# Patient Record
Sex: FEMALE
[De-identification: names, ages, dates, MRNs, and addresses within clinical notes are randomized per-mention and may not be internally consistent; named-entity substitution may affect disease eponyms.]

---

## 2018-12-09 ENCOUNTER — TRANSCRIPTION ENCOUNTER (OUTPATIENT)
Age: 28
End: 2018-12-09

## 2021-08-04 PROBLEM — Z00.00 ENCOUNTER FOR PREVENTIVE HEALTH EXAMINATION: Status: ACTIVE | Noted: 2021-08-04

## 2022-06-27 ENCOUNTER — INPATIENT (INPATIENT)
Facility: HOSPITAL | Age: 32
LOS: 2 days | Discharge: HOME | DRG: 463 | End: 2022-06-30
Attending: INTERNAL MEDICINE | Admitting: STUDENT IN AN ORGANIZED HEALTH CARE EDUCATION/TRAINING PROGRAM
Payer: MEDICAID

## 2022-06-27 VITALS
SYSTOLIC BLOOD PRESSURE: 126 MMHG | WEIGHT: 160.06 LBS | DIASTOLIC BLOOD PRESSURE: 65 MMHG | TEMPERATURE: 99 F | HEART RATE: 67 BPM | HEIGHT: 65 IN | RESPIRATION RATE: 18 BRPM | OXYGEN SATURATION: 100 %

## 2022-06-27 LAB
ALBUMIN SERPL ELPH-MCNC: 4 G/DL — SIGNIFICANT CHANGE UP (ref 3.3–5)
ALP SERPL-CCNC: 68 U/L — SIGNIFICANT CHANGE UP (ref 40–120)
ALT FLD-CCNC: 20 U/L — SIGNIFICANT CHANGE UP (ref 12–78)
ANION GAP SERPL CALC-SCNC: 7 MMOL/L — SIGNIFICANT CHANGE UP (ref 5–17)
APPEARANCE UR: ABNORMAL
AST SERPL-CCNC: 11 U/L — LOW (ref 15–37)
BASOPHILS # BLD AUTO: 0.11 K/UL — SIGNIFICANT CHANGE UP (ref 0–0.2)
BASOPHILS NFR BLD AUTO: 0.6 % — SIGNIFICANT CHANGE UP (ref 0–2)
BILIRUB SERPL-MCNC: 0.2 MG/DL — SIGNIFICANT CHANGE UP (ref 0.2–1.2)
BILIRUB UR-MCNC: NEGATIVE — SIGNIFICANT CHANGE UP
BUN SERPL-MCNC: 13 MG/DL — SIGNIFICANT CHANGE UP (ref 7–23)
CALCIUM SERPL-MCNC: 9.3 MG/DL — SIGNIFICANT CHANGE UP (ref 8.5–10.1)
CHLORIDE SERPL-SCNC: 109 MMOL/L — HIGH (ref 96–108)
CO2 SERPL-SCNC: 24 MMOL/L — SIGNIFICANT CHANGE UP (ref 22–31)
COLOR SPEC: ABNORMAL
CREAT SERPL-MCNC: 0.82 MG/DL — SIGNIFICANT CHANGE UP (ref 0.5–1.3)
DIFF PNL FLD: ABNORMAL
EGFR: 99 ML/MIN/1.73M2 — SIGNIFICANT CHANGE UP
EOSINOPHIL # BLD AUTO: 0.24 K/UL — SIGNIFICANT CHANGE UP (ref 0–0.5)
EOSINOPHIL NFR BLD AUTO: 1.3 % — SIGNIFICANT CHANGE UP (ref 0–6)
GLUCOSE SERPL-MCNC: 106 MG/DL — HIGH (ref 70–99)
GLUCOSE UR QL: NEGATIVE — SIGNIFICANT CHANGE UP
HCG SERPL-ACNC: <1 MIU/ML — SIGNIFICANT CHANGE UP
HCT VFR BLD CALC: 40.6 % — SIGNIFICANT CHANGE UP (ref 34.5–45)
HGB BLD-MCNC: 13.1 G/DL — SIGNIFICANT CHANGE UP (ref 11.5–15.5)
IMM GRANULOCYTES NFR BLD AUTO: 0.4 % — SIGNIFICANT CHANGE UP (ref 0–1.5)
KETONES UR-MCNC: ABNORMAL
LEUKOCYTE ESTERASE UR-ACNC: ABNORMAL
LYMPHOCYTES # BLD AUTO: 16.4 % — SIGNIFICANT CHANGE UP (ref 13–44)
LYMPHOCYTES # BLD AUTO: 2.91 K/UL — SIGNIFICANT CHANGE UP (ref 1–3.3)
MCHC RBC-ENTMCNC: 26.4 PG — LOW (ref 27–34)
MCHC RBC-ENTMCNC: 32.3 GM/DL — SIGNIFICANT CHANGE UP (ref 32–36)
MCV RBC AUTO: 81.7 FL — SIGNIFICANT CHANGE UP (ref 80–100)
MONOCYTES # BLD AUTO: 0.85 K/UL — SIGNIFICANT CHANGE UP (ref 0–0.9)
MONOCYTES NFR BLD AUTO: 4.8 % — SIGNIFICANT CHANGE UP (ref 2–14)
NEUTROPHILS # BLD AUTO: 13.59 K/UL — HIGH (ref 1.8–7.4)
NEUTROPHILS NFR BLD AUTO: 76.5 % — SIGNIFICANT CHANGE UP (ref 43–77)
NITRITE UR-MCNC: POSITIVE
PH UR: 6.5 — SIGNIFICANT CHANGE UP (ref 5–8)
PLATELET # BLD AUTO: 447 K/UL — HIGH (ref 150–400)
POTASSIUM SERPL-MCNC: 4 MMOL/L — SIGNIFICANT CHANGE UP (ref 3.5–5.3)
POTASSIUM SERPL-SCNC: 4 MMOL/L — SIGNIFICANT CHANGE UP (ref 3.5–5.3)
PROT SERPL-MCNC: 8.1 GM/DL — SIGNIFICANT CHANGE UP (ref 6–8.3)
PROT UR-MCNC: 100
RBC # BLD: 4.97 M/UL — SIGNIFICANT CHANGE UP (ref 3.8–5.2)
RBC # FLD: 14 % — SIGNIFICANT CHANGE UP (ref 10.3–14.5)
SODIUM SERPL-SCNC: 140 MMOL/L — SIGNIFICANT CHANGE UP (ref 135–145)
SP GR SPEC: 1.01 — SIGNIFICANT CHANGE UP (ref 1.01–1.02)
UROBILINOGEN FLD QL: 1
WBC # BLD: 17.78 K/UL — HIGH (ref 3.8–10.5)
WBC # FLD AUTO: 17.78 K/UL — HIGH (ref 3.8–10.5)

## 2022-06-27 PROCEDURE — 99285 EMERGENCY DEPT VISIT HI MDM: CPT

## 2022-06-27 PROCEDURE — 74176 CT ABD & PELVIS W/O CONTRAST: CPT | Mod: 26,MA

## 2022-06-27 RX ORDER — MORPHINE SULFATE 50 MG/1
4 CAPSULE, EXTENDED RELEASE ORAL ONCE
Refills: 0 | Status: DISCONTINUED | OUTPATIENT
Start: 2022-06-27 | End: 2022-06-27

## 2022-06-27 RX ORDER — ONDANSETRON 8 MG/1
4 TABLET, FILM COATED ORAL ONCE
Refills: 0 | Status: COMPLETED | OUTPATIENT
Start: 2022-06-27 | End: 2022-06-27

## 2022-06-27 RX ORDER — CEFTRIAXONE 500 MG/1
1000 INJECTION, POWDER, FOR SOLUTION INTRAMUSCULAR; INTRAVENOUS ONCE
Refills: 0 | Status: COMPLETED | OUTPATIENT
Start: 2022-06-27 | End: 2022-06-27

## 2022-06-27 RX ORDER — KETOROLAC TROMETHAMINE 30 MG/ML
30 SYRINGE (ML) INJECTION ONCE
Refills: 0 | Status: DISCONTINUED | OUTPATIENT
Start: 2022-06-27 | End: 2022-06-27

## 2022-06-27 RX ORDER — SODIUM CHLORIDE 9 MG/ML
1000 INJECTION INTRAMUSCULAR; INTRAVENOUS; SUBCUTANEOUS ONCE
Refills: 0 | Status: COMPLETED | OUTPATIENT
Start: 2022-06-27 | End: 2022-06-27

## 2022-06-27 RX ADMIN — MORPHINE SULFATE 4 MILLIGRAM(S): 50 CAPSULE, EXTENDED RELEASE ORAL at 23:53

## 2022-06-27 RX ADMIN — ONDANSETRON 4 MILLIGRAM(S): 8 TABLET, FILM COATED ORAL at 22:04

## 2022-06-27 RX ADMIN — MORPHINE SULFATE 4 MILLIGRAM(S): 50 CAPSULE, EXTENDED RELEASE ORAL at 22:18

## 2022-06-27 RX ADMIN — SODIUM CHLORIDE 1000 MILLILITER(S): 9 INJECTION INTRAMUSCULAR; INTRAVENOUS; SUBCUTANEOUS at 22:03

## 2022-06-27 RX ADMIN — CEFTRIAXONE 100 MILLIGRAM(S): 500 INJECTION, POWDER, FOR SOLUTION INTRAMUSCULAR; INTRAVENOUS at 23:23

## 2022-06-27 RX ADMIN — Medication 30 MILLIGRAM(S): at 22:18

## 2022-06-27 NOTE — ED STATDOCS - PROGRESS NOTE DETAILS
Patient admitted to medicine, urology consulted, pending call back. William Montiel D.O. Discussed with urology on call, Dr. Cisneros, consult in AM. William Montiel D.O.

## 2022-06-27 NOTE — ED STATDOCS - GASTROINTESTINAL, MLM
abdomen soft, mild LLQ tenderness, and non-distended. Bowel sounds present. No rebound or guarding. No CVA tenderness.

## 2022-06-27 NOTE — ED STATDOCS - CLINICAL SUMMARY MEDICAL DECISION MAKING FREE TEXT BOX
Patient afebrile, normal BP and HR.  UTI on UA, with 4 mm kidney stone distal left ureter with mild hydro.  Only one SIRS criteria for WBC elevation.  Given IVF, pain meds, zofran.  Still having pain.  Admit to medicine service.  Consulting urology.

## 2022-06-27 NOTE — ED ADULT NURSE NOTE - NSIMPLEMENTINTERV_GEN_ALL_ED
Implemented All Universal Safety Interventions:  [place] to call system. Call bell, personal items and telephone within reach. Instruct patient to call for assistance. Room bathroom lighting operational. Non-slip footwear when patient is off stretcher. Physically safe environment: no spills, clutter or unnecessary equipment. Stretcher in lowest position, wheels locked, appropriate side rails in place.

## 2022-06-27 NOTE — ED STATDOCS - OBJECTIVE STATEMENT
32 y/o female with no significant PMHx presents to the ED c/o left flank pain, nausea, and difficulty urinating since this morning. Pt reports she urinated once earlier today and it was dark. Pt also had 1 episode of vomiting. Denies diarrhea and any other symptoms. Denies history of kidney stones.

## 2022-06-27 NOTE — ED ADULT NURSE NOTE - OBJECTIVE STATEMENT
Patient presents to the emergency room with complaints of lower back pain radiating to abdomen. Patient alert, oriented, tearful, and guarding abdomen. Patient unable to void at this time for urine sample but complained of hematuria at home. Patient denies vomiting, diarrhea, fever, chills.

## 2022-06-27 NOTE — ED STATDOCS - ADMIT DISPOSITION PRESENT ON ADMISSION SEPSIS
Tazorac Counseling:  Patient advised that medication is irritating and drying.  Patient may need to apply sparingly and wash off after an hour before eventually leaving it on overnight.  The patient verbalized understanding of the proper use and possible adverse effects of tazorac.  All of the patient's questions and concerns were addressed.
Isotretinoin Counseling: Patient should get monthly blood tests, not donate blood, not drive at night if vision affected, not share medication, and not undergo elective surgery for 6 months after tx completed. Side effects reviewed, pt to contact office should one occur.
Minocycline Pregnancy And Lactation Text: This medication is Pregnancy Category D and not consider safe during pregnancy. It is also excreted in breast milk.
Topical Sulfur Applications Pregnancy And Lactation Text: This medication is Pregnancy Category C and has an unknown safety profile during pregnancy. It is unknown if this topical medication is excreted in breast milk.
Bactrim Counseling:  I discussed with the patient the risks of sulfa antibiotics including but not limited to GI upset, allergic reaction, drug rash, diarrhea, dizziness, photosensitivity, and yeast infections.  Rarely, more serious reactions can occur including but not limited to aplastic anemia, agranulocytosis, methemoglobinemia, blood dyscrasias, liver or kidney failure, lung infiltrates or desquamative/blistering drug rashes.
Dapsone Pregnancy And Lactation Text: This medication is Pregnancy Category C and is not considered safe during pregnancy or breast feeding.
Topical Retinoid Pregnancy And Lactation Text: This medication is Pregnancy Category C. It is unknown if this medication is excreted in breast milk.
Erythromycin Pregnancy And Lactation Text: This medication is Pregnancy Category B and is considered safe during pregnancy. It is also excreted in breast milk.
Topical Sulfur Applications Counseling: Topical Sulfur Counseling: Patient counseled that this medication may cause skin irritation or allergic reactions.  In the event of skin irritation, the patient was advised to reduce the amount of the drug applied or use it less frequently.   The patient verbalized understanding of the proper use and possible adverse effects of topical sulfur application.  All of the patient's questions and concerns were addressed.
Minocycline Counseling: Patient advised regarding possible photosensitivity and discoloration of the teeth, skin, lips, tongue and gums.  Patient instructed to avoid sunlight, if possible.  When exposed to sunlight, patients should wear protective clothing, sunglasses, and sunscreen.  The patient was instructed to call the office immediately if the following severe adverse effects occur:  hearing changes, easy bruising/bleeding, severe headache, or vision changes.  The patient verbalized understanding of the proper use and possible adverse effects of minocycline.  All of the patient's questions and concerns were addressed.
Dapsone Counseling: I discussed with the patient the risks of dapsone including but not limited to hemolytic anemia, agranulocytosis, rashes, methemoglobinemia, kidney failure, peripheral neuropathy, headaches, GI upset, and liver toxicity.  Patients who start dapsone require monitoring including baseline LFTs and weekly CBCs for the first month, then every month thereafter.  The patient verbalized understanding of the proper use and possible adverse effects of dapsone.  All of the patient's questions and concerns were addressed.
Azithromycin Pregnancy And Lactation Text: This medication is considered safe during pregnancy and is also secreted in breast milk.
High Dose Vitamin A Pregnancy And Lactation Text: High dose vitamin A therapy is contraindicated during pregnancy and breast feeding.
Topical Clindamycin Pregnancy And Lactation Text: This medication is Pregnancy Category B and is considered safe during pregnancy. It is unknown if it is excreted in breast milk.
Tetracycline Counseling: Patient counseled regarding possible photosensitivity and increased risk for sunburn.  Patient instructed to avoid sunlight, if possible.  When exposed to sunlight, patients should wear protective clothing, sunglasses, and sunscreen.  The patient was instructed to call the office immediately if the following severe adverse effects occur:  hearing changes, easy bruising/bleeding, severe headache, or vision changes.  The patient verbalized understanding of the proper use and possible adverse effects of tetracycline.  All of the patient's questions and concerns were addressed. Patient understands to avoid pregnancy while on therapy due to potential birth defects.
Birth Control Pills Pregnancy And Lactation Text: This medication should be avoided if pregnant and for the first 30 days post-partum.
Erythromycin Counseling:  I discussed with the patient the risks of erythromycin including but not limited to GI upset, allergic reaction, drug rash, diarrhea, increase in liver enzymes, and yeast infections.
Topical Retinoid counseling:  Patient advised to apply a pea-sized amount only at bedtime and wait 30 minutes after washing their face before applying.  If too drying, patient may add a non-comedogenic moisturizer. The patient verbalized understanding of the proper use and possible adverse effects of retinoids.  All of the patient's questions and concerns were addressed.
Azithromycin Counseling:  I discussed with the patient the risks of azithromycin including but not limited to GI upset, allergic reaction, drug rash, diarrhea, and yeast infections.
High Dose Vitamin A Counseling: Side effects reviewed, pt to contact office should one occur.
Spironolactone Pregnancy And Lactation Text: This medication can cause feminization of the male fetus and should be avoided during pregnancy. The active metabolite is also found in breast milk.
Use Enhanced Medication Counseling?: No
Benzoyl Peroxide Pregnancy And Lactation Text: This medication is Pregnancy Category C. It is unknown if benzoyl peroxide is excreted in breast milk.
Doxycycline Pregnancy And Lactation Text: This medication is Pregnancy Category D and not consider safe during pregnancy. It is also excreted in breast milk but is considered safe for shorter treatment courses.
Topical Clindamycin Counseling: Patient counseled that this medication may cause skin irritation or allergic reactions.  In the event of skin irritation, the patient was advised to reduce the amount of the drug applied or use it less frequently.   The patient verbalized understanding of the proper use and possible adverse effects of clindamycin.  All of the patient's questions and concerns were addressed.
Detail Level: Zone
Birth Control Pills Counseling: Birth Control Pill Counseling: I discussed with the patient the potential side effects of OCPs including but not limited to increased risk of stroke, heart attack, thrombophlebitis, deep venous thrombosis, hepatic adenomas, breast changes, GI upset, headaches, and depression.  The patient verbalized understanding of the proper use and possible adverse effects of OCPs. All of the patient's questions and concerns were addressed.
Spironolactone Counseling: Patient advised regarding risks of diarrhea, abdominal pain, hyperkalemia, birth defects (for female patients), liver toxicity and renal toxicity. The patient may need blood work to monitor liver and kidney function and potassium levels while on therapy. The patient verbalized understanding of the proper use and possible adverse effects of spironolactone.  All of the patient's questions and concerns were addressed.
Doxycycline Counseling:  Patient counseled regarding possible photosensitivity and increased risk for sunburn.  Patient instructed to avoid sunlight, if possible.  When exposed to sunlight, patients should wear protective clothing, sunglasses, and sunscreen.  The patient was instructed to call the office immediately if the following severe adverse effects occur:  hearing changes, easy bruising/bleeding, severe headache, or vision changes.  The patient verbalized understanding of the proper use and possible adverse effects of doxycycline.  All of the patient's questions and concerns were addressed.
Isotretinoin Pregnancy And Lactation Text: This medication is Pregnancy Category X and is considered extremely dangerous during pregnancy. It is unknown if it is excreted in breast milk.
Tazorac Pregnancy And Lactation Text: This medication is not safe during pregnancy. It is unknown if this medication is excreted in breast milk.
Bactrim Pregnancy And Lactation Text: This medication is Pregnancy Category D and is known to cause fetal risk.  It is also excreted in breast milk.
Benzoyl Peroxide Counseling: Patient counseled that medicine may cause skin irritation and bleach clothing.  In the event of skin irritation, the patient was advised to reduce the amount of the drug applied or use it less frequently.   The patient verbalized understanding of the proper use and possible adverse effects of benzoyl peroxide.  All of the patient's questions and concerns were addressed.
No

## 2022-06-28 DIAGNOSIS — N20.0 CALCULUS OF KIDNEY: ICD-10-CM

## 2022-06-28 DIAGNOSIS — N20.1 CALCULUS OF URETER: ICD-10-CM

## 2022-06-28 LAB
ANION GAP SERPL CALC-SCNC: 5 MMOL/L — SIGNIFICANT CHANGE UP (ref 5–17)
BUN SERPL-MCNC: 10 MG/DL — SIGNIFICANT CHANGE UP (ref 7–23)
CALCIUM SERPL-MCNC: 8.3 MG/DL — LOW (ref 8.5–10.1)
CHLORIDE SERPL-SCNC: 114 MMOL/L — HIGH (ref 96–108)
CO2 SERPL-SCNC: 23 MMOL/L — SIGNIFICANT CHANGE UP (ref 22–31)
CREAT SERPL-MCNC: 0.64 MG/DL — SIGNIFICANT CHANGE UP (ref 0.5–1.3)
EGFR: 122 ML/MIN/1.73M2 — SIGNIFICANT CHANGE UP
GLUCOSE SERPL-MCNC: 84 MG/DL — SIGNIFICANT CHANGE UP (ref 70–99)
HCT VFR BLD CALC: 37.7 % — SIGNIFICANT CHANGE UP (ref 34.5–45)
HGB BLD-MCNC: 11.6 G/DL — SIGNIFICANT CHANGE UP (ref 11.5–15.5)
MCHC RBC-ENTMCNC: 25.8 PG — LOW (ref 27–34)
MCHC RBC-ENTMCNC: 30.8 GM/DL — LOW (ref 32–36)
MCV RBC AUTO: 84 FL — SIGNIFICANT CHANGE UP (ref 80–100)
PLATELET # BLD AUTO: 354 K/UL — SIGNIFICANT CHANGE UP (ref 150–400)
POTASSIUM SERPL-MCNC: 3.7 MMOL/L — SIGNIFICANT CHANGE UP (ref 3.5–5.3)
POTASSIUM SERPL-SCNC: 3.7 MMOL/L — SIGNIFICANT CHANGE UP (ref 3.5–5.3)
RBC # BLD: 4.49 M/UL — SIGNIFICANT CHANGE UP (ref 3.8–5.2)
RBC # FLD: 14.2 % — SIGNIFICANT CHANGE UP (ref 10.3–14.5)
SARS-COV-2 RNA SPEC QL NAA+PROBE: SIGNIFICANT CHANGE UP
SODIUM SERPL-SCNC: 142 MMOL/L — SIGNIFICANT CHANGE UP (ref 135–145)
WBC # BLD: 12.74 K/UL — HIGH (ref 3.8–10.5)
WBC # FLD AUTO: 12.74 K/UL — HIGH (ref 3.8–10.5)

## 2022-06-28 PROCEDURE — 85027 COMPLETE CBC AUTOMATED: CPT

## 2022-06-28 PROCEDURE — 80048 BASIC METABOLIC PNL TOTAL CA: CPT

## 2022-06-28 PROCEDURE — C2625: CPT

## 2022-06-28 PROCEDURE — 76000 FLUOROSCOPY <1 HR PHYS/QHP: CPT

## 2022-06-28 PROCEDURE — 52332 CYSTOSCOPY AND TREATMENT: CPT | Mod: LT

## 2022-06-28 PROCEDURE — 36415 COLL VENOUS BLD VENIPUNCTURE: CPT

## 2022-06-28 PROCEDURE — 99222 1ST HOSP IP/OBS MODERATE 55: CPT

## 2022-06-28 PROCEDURE — C1769: CPT

## 2022-06-28 PROCEDURE — U0003: CPT

## 2022-06-28 PROCEDURE — U0005: CPT

## 2022-06-28 RX ORDER — MORPHINE SULFATE 50 MG/1
2 CAPSULE, EXTENDED RELEASE ORAL EVERY 4 HOURS
Refills: 0 | Status: DISCONTINUED | OUTPATIENT
Start: 2022-06-28 | End: 2022-06-30

## 2022-06-28 RX ORDER — LANOLIN ALCOHOL/MO/W.PET/CERES
3 CREAM (GRAM) TOPICAL AT BEDTIME
Refills: 0 | Status: DISCONTINUED | OUTPATIENT
Start: 2022-06-28 | End: 2022-06-30

## 2022-06-28 RX ORDER — SODIUM CHLORIDE 9 MG/ML
1000 INJECTION INTRAMUSCULAR; INTRAVENOUS; SUBCUTANEOUS
Refills: 0 | Status: DISCONTINUED | OUTPATIENT
Start: 2022-06-28 | End: 2022-06-28

## 2022-06-28 RX ORDER — OXYCODONE HYDROCHLORIDE 5 MG/1
5 TABLET ORAL EVERY 4 HOURS
Refills: 0 | Status: DISCONTINUED | OUTPATIENT
Start: 2022-06-28 | End: 2022-06-30

## 2022-06-28 RX ORDER — ACETAMINOPHEN 500 MG
650 TABLET ORAL EVERY 6 HOURS
Refills: 0 | Status: DISCONTINUED | OUTPATIENT
Start: 2022-06-28 | End: 2022-06-30

## 2022-06-28 RX ORDER — OXYCODONE HYDROCHLORIDE 5 MG/1
5 TABLET ORAL ONCE
Refills: 0 | Status: DISCONTINUED | OUTPATIENT
Start: 2022-06-28 | End: 2022-06-28

## 2022-06-28 RX ORDER — ONDANSETRON 8 MG/1
4 TABLET, FILM COATED ORAL ONCE
Refills: 0 | Status: DISCONTINUED | OUTPATIENT
Start: 2022-06-28 | End: 2022-06-28

## 2022-06-28 RX ORDER — TAMSULOSIN HYDROCHLORIDE 0.4 MG/1
0.4 CAPSULE ORAL AT BEDTIME
Refills: 0 | Status: DISCONTINUED | OUTPATIENT
Start: 2022-06-28 | End: 2022-06-30

## 2022-06-28 RX ORDER — ALBUTEROL 90 UG/1
2 AEROSOL, METERED ORAL EVERY 6 HOURS
Refills: 0 | Status: DISCONTINUED | OUTPATIENT
Start: 2022-06-28 | End: 2022-06-30

## 2022-06-28 RX ORDER — IBUPROFEN 200 MG
400 TABLET ORAL EVERY 6 HOURS
Refills: 0 | Status: DISCONTINUED | OUTPATIENT
Start: 2022-06-28 | End: 2022-06-30

## 2022-06-28 RX ORDER — FENTANYL CITRATE 50 UG/ML
25 INJECTION INTRAVENOUS
Refills: 0 | Status: DISCONTINUED | OUTPATIENT
Start: 2022-06-28 | End: 2022-06-28

## 2022-06-28 RX ORDER — CEFTRIAXONE 500 MG/1
1000 INJECTION, POWDER, FOR SOLUTION INTRAMUSCULAR; INTRAVENOUS EVERY 24 HOURS
Refills: 0 | Status: DISCONTINUED | OUTPATIENT
Start: 2022-06-28 | End: 2022-06-28

## 2022-06-28 RX ORDER — CEFTRIAXONE 500 MG/1
1000 INJECTION, POWDER, FOR SOLUTION INTRAMUSCULAR; INTRAVENOUS EVERY 24 HOURS
Refills: 0 | Status: DISCONTINUED | OUTPATIENT
Start: 2022-06-28 | End: 2022-06-30

## 2022-06-28 RX ORDER — SODIUM CHLORIDE 9 MG/ML
1000 INJECTION, SOLUTION INTRAVENOUS
Refills: 0 | Status: DISCONTINUED | OUTPATIENT
Start: 2022-06-28 | End: 2022-06-28

## 2022-06-28 RX ORDER — ONDANSETRON 8 MG/1
4 TABLET, FILM COATED ORAL EVERY 6 HOURS
Refills: 0 | Status: DISCONTINUED | OUTPATIENT
Start: 2022-06-28 | End: 2022-06-30

## 2022-06-28 RX ORDER — SODIUM CHLORIDE 9 MG/ML
1000 INJECTION, SOLUTION INTRAVENOUS
Refills: 0 | Status: DISCONTINUED | OUTPATIENT
Start: 2022-06-28 | End: 2022-06-29

## 2022-06-28 RX ORDER — ACETAMINOPHEN 500 MG
1000 TABLET ORAL ONCE
Refills: 0 | Status: DISCONTINUED | OUTPATIENT
Start: 2022-06-28 | End: 2022-06-28

## 2022-06-28 RX ADMIN — SODIUM CHLORIDE 100 MILLILITER(S): 9 INJECTION, SOLUTION INTRAVENOUS at 11:24

## 2022-06-28 RX ADMIN — Medication 650 MILLIGRAM(S): at 20:15

## 2022-06-28 RX ADMIN — MORPHINE SULFATE 2 MILLIGRAM(S): 50 CAPSULE, EXTENDED RELEASE ORAL at 13:49

## 2022-06-28 RX ADMIN — SODIUM CHLORIDE 100 MILLILITER(S): 9 INJECTION, SOLUTION INTRAVENOUS at 13:50

## 2022-06-28 RX ADMIN — OXYCODONE HYDROCHLORIDE 5 MILLIGRAM(S): 5 TABLET ORAL at 23:58

## 2022-06-28 RX ADMIN — MORPHINE SULFATE 2 MILLIGRAM(S): 50 CAPSULE, EXTENDED RELEASE ORAL at 07:35

## 2022-06-28 RX ADMIN — MORPHINE SULFATE 2 MILLIGRAM(S): 50 CAPSULE, EXTENDED RELEASE ORAL at 03:12

## 2022-06-28 RX ADMIN — Medication 400 MILLIGRAM(S): at 22:57

## 2022-06-28 RX ADMIN — TAMSULOSIN HYDROCHLORIDE 0.4 MILLIGRAM(S): 0.4 CAPSULE ORAL at 22:47

## 2022-06-28 RX ADMIN — SODIUM CHLORIDE 100 MILLILITER(S): 9 INJECTION INTRAMUSCULAR; INTRAVENOUS; SUBCUTANEOUS at 07:35

## 2022-06-28 RX ADMIN — Medication 650 MILLIGRAM(S): at 19:41

## 2022-06-28 RX ADMIN — Medication 400 MILLIGRAM(S): at 23:45

## 2022-06-28 RX ADMIN — MORPHINE SULFATE 2 MILLIGRAM(S): 50 CAPSULE, EXTENDED RELEASE ORAL at 02:57

## 2022-06-28 NOTE — ED ADULT NURSE REASSESSMENT NOTE - NS ED NURSE REASSESS COMMENT FT1
HOC from previous RN Shameka. Fluid infusing, appreciates no c/o pain at this time. Will continue to monitor and maintain safety in ED. Patient admitted, pending COVID result and bed placement.

## 2022-06-28 NOTE — ED ADULT NURSE REASSESSMENT NOTE - NS ED NURSE REASSESS COMMENT FT1
Pt received from ED nurse. Pt stable. VS WNL. Pt c/o pain. Pt given PRN Morphine 2mg IVP. Pt able to ambulate self to restroom. Pt ready to move to unit. Safety and comfort measures applied. Will con't to monitor.

## 2022-06-28 NOTE — H&P ADULT - HISTORY OF PRESENT ILLNESS
31/F with no significant PMHx presents to the ED c/o left flank pain, nausea, and difficulty urinating x 1 day.  Pt reports her urine was dark in color. Pt also had 1 episode of vomiting. Denies diarrhea and any other symptoms. Denies history of kidney stones.    CT abdo showed 4  mm distal left ureteral stone

## 2022-06-28 NOTE — H&P ADULT - NSHPLABSRESULTS_GEN_ALL_CORE
All Labs/EKG/Radiology/Meds reviewed by me.     Lab Results:  CBC  CBC Full  -  ( 2022 21:28 )  WBC Count : 17.78 K/uL  RBC Count : 4.97 M/uL  Hemoglobin : 13.1 g/dL  Hematocrit : 40.6 %  Platelet Count - Automated : 447 K/uL  Mean Cell Volume : 81.7 fl  Mean Cell Hemoglobin : 26.4 pg  Mean Cell Hemoglobin Concentration : 32.3 gm/dL  Auto Neutrophil # : 13.59 K/uL  Auto Lymphocyte # : 2.91 K/uL  Auto Monocyte # : 0.85 K/uL  Auto Eosinophil # : 0.24 K/uL  Auto Basophil # : 0.11 K/uL  Auto Neutrophil % : 76.5 %  Auto Lymphocyte % : 16.4 %  Auto Monocyte % : 4.8 %  Auto Eosinophil % : 1.3 %  Auto Basophil % : 0.6 %    .		Differential:	[] Automated		[] Manual  Chemistry                        13.1   17.78 )-----------( 447      ( 2022 21:28 )             40.6     27    140  |  109<H>  |  13  ----------------------------<  106<H>  4.0   |  24  |  0.82    Ca    9.3      2022 21:28    TPro  8.1  /  Alb  4.0  /  TBili  0.2  /  DBili  x   /  AST  11<L>  /  ALT  20  /  AlkPhos  68      LIVER FUNCTIONS - ( 2022 21:28 )  Alb: 4.0 g/dL / Pro: 8.1 gm/dL / ALK PHOS: 68 U/L / ALT: 20 U/L / AST: 11 U/L / GGT: x             Urinalysis Basic - ( 2022 22:04 )    Color: Red / Appearance: bloody / S.015 / pH: x  Gluc: x / Ketone: Trace  / Bili: Negative / Urobili: 1   Blood: x / Protein: 100 / Nitrite: Positive   Leuk Esterase: Trace / RBC: >50 /HPF / WBC 3-5   Sq Epi: x / Non Sq Epi: Moderate / Bacteria: Moderate    RADIOLOGY RESULTS:  < from: CT Abdomen and Pelvis No Cont (22 @ 22:54) >    4 mm stone in distal left ureter with associated mild left hydronephrosis.    < end of copied text >        MEDICATIONS  (STANDING):  cefTRIAXone   IVPB 1000 milliGRAM(s) IV Intermittent every 24 hours  dextrose 5% + sodium chloride 0.9%. 1000 milliLiter(s) (100 mL/Hr) IV Continuous <Continuous>  tamsulosin 0.4 milliGRAM(s) Oral at bedtime    MEDICATIONS  (PRN):  acetaminophen     Tablet .. 650 milliGRAM(s) Oral every 6 hours PRN Temp greater or equal to 38C (100.4F)  ALBUTerol    90 MICROgram(s) HFA Inhaler 2 Puff(s) Inhalation every 6 hours PRN Shortness of Breath and/or Wheezing  aluminum hydroxide/magnesium hydroxide/simethicone Suspension 30 milliLiter(s) Oral every 4 hours PRN Dyspepsia  melatonin 3 milliGRAM(s) Oral at bedtime PRN Insomnia  morphine  - Injectable 2 milliGRAM(s) IV Push every 4 hours PRN Severe Pain (7 - 10)  ondansetron Injectable 4 milliGRAM(s) IV Push every 6 hours PRN Nausea and/or Vomiting

## 2022-06-28 NOTE — H&P ADULT - NSHPPHYSICALEXAM_GEN_ALL_CORE
PHYSICAL EXAM:    Daily Height in cm: 165.1 (27 Jun 2022 20:18)    Daily     Vital Signs Last 24 Hrs  T(C): 36.5 (28 Jun 2022 08:29), Max: 37.1 (27 Jun 2022 20:18)  T(F): 97.7 (28 Jun 2022 08:29), Max: 98.8 (27 Jun 2022 20:18)  HR: 56 (28 Jun 2022 08:29) (56 - 72)  BP: 102/78 (28 Jun 2022 08:29) (102/78 - 126/65)  BP(mean): 87 (28 Jun 2022 08:29) (84 - 92)  RR: 17 (28 Jun 2022 08:29) (17 - 18)  SpO2: 100% (28 Jun 2022 08:29) (100% - 100%)    Constitutional: Weak  appearing  HEENT: Atraumatic, LATANYA, Normal, No congestion  Respiratory: Breath Sounds normal, no rhonchi/wheeze  Cardiovascular: N S1S2;   Gastrointestinal: Abdomen soft, non tender, Bowel Sounds present  Extremities: No edema, peripheral pulses present  Neurological: AAO x 3, no gross focal motor deficits  Skin: Non cellulitic, no rash, ulcers  Lymph Nodes: No lymphadenopathy noted  Back: No CVA tenderness   Musculoskeletal: non tender  Breasts: Deferred  Genitourinary: deferred  Rectal: Deferred

## 2022-06-28 NOTE — CONSULT NOTE ADULT - SUBJECTIVE AND OBJECTIVE BOX
HPI:  31/F with no significant PMHx presents to the ED c/o left flank pain, nausea, and difficulty urinating x 1 day.  Pt reports her urine was dark in color. Pt also had 1 episode of vomiting. Denies diarrhea and any other symptoms. Denies history of kidney stones.    CT abdo showed 4  mm distal left ureteral stone (2022 11:18)    32 yo female with no PMH admitted for intractable left flank pain found to have a 4 mm left distal ureteral stone and concern for UTI. Patient seen at bedside, reports she developed intractable left flank pain and difficulty voiding since yesterday also had a NBNB vomiting episode. Reports chills but denies any fevers, dysuria, frequency or urgency.     PAST MEDICAL & SURGICAL HISTORY:  No pertinent past medical history      No significant past surgical history          REVIEW OF SYSTEMS    All other review of systems neg, except as noted in HPI    MEDICATIONS  (STANDING):  cefTRIAXone   IVPB 1000 milliGRAM(s) IV Intermittent every 24 hours  dextrose 5% + sodium chloride 0.9%. 1000 milliLiter(s) (100 mL/Hr) IV Continuous <Continuous>  tamsulosin 0.4 milliGRAM(s) Oral at bedtime    MEDICATIONS  (PRN):  acetaminophen     Tablet .. 650 milliGRAM(s) Oral every 6 hours PRN Temp greater or equal to 38C (100.4F)  ALBUTerol    90 MICROgram(s) HFA Inhaler 2 Puff(s) Inhalation every 6 hours PRN Shortness of Breath and/or Wheezing  aluminum hydroxide/magnesium hydroxide/simethicone Suspension 30 milliLiter(s) Oral every 4 hours PRN Dyspepsia  melatonin 3 milliGRAM(s) Oral at bedtime PRN Insomnia  morphine  - Injectable 2 milliGRAM(s) IV Push every 4 hours PRN Severe Pain (7 - 10)  ondansetron Injectable 4 milliGRAM(s) IV Push every 6 hours PRN Nausea and/or Vomiting      Allergies    No Known Allergies    Intolerances        SOCIAL HISTORY:    FAMILY HISTORY:  Family history of diabetes mellitus in mother        Vital Signs Last 24 Hrs  T(C): 36.5 (2022 08:29), Max: 37.1 (2022 20:18)  T(F): 97.7 (2022 08:29), Max: 98.8 (2022 20:18)  HR: 56 (2022 08:29) (56 - 72)  BP: 102/78 (2022 08:29) (102/78 - 126/65)  BP(mean): 87 (2022 08:29) (84 - 92)  RR: 17 (2022 08:29) (17 - 18)  SpO2: 100% (2022 08:29) (100% - 100%)    PHYSICAL EXAM:    General: No distress, No anxiety  VITALS  T(C): 36.5 (22 @ 08:29), Max: 37.1 (22 @ 20:18)  HR: 56 (22 @ 08:29) (56 - 72)  BP: 102/78 (22 @ 08:29) (102/78 - 126/65)  RR: 17 (22 @ 08:29) (17 - 18)  SpO2: 100% (22 @ 08:29) (100% - 100%)            Skin     : No jaundice, No lesions, No swelling  HEENT: Normocephalic, no icterus , EOM full , No epistaxis  Lung    : No resp distress  Abdo:   : Soft, Non tender, No guarding, No distension   Back    : No CVAT b/l    Genitalia Female: No Bess  Neuro   : A&Ox3        LABS:                        11.6   12.74 )-----------( 354      ( 2022 11:26 )             37.7     06-28    142  |  114<H>  |  10  ----------------------------<  84  3.7   |  23  |  0.64    Ca    8.3<L>      2022 11:26    TPro  8.1  /  Alb  4.0  /  TBili  0.2  /  DBili  x   /  AST  11<L>  /  ALT  20  /  AlkPhos  68  06-27      Urinalysis Basic - ( 2022 22:04 )    Color: Red / Appearance: bloody / S.015 / pH: x  Gluc: x / Ketone: Trace  / Bili: Negative / Urobili: 1   Blood: x / Protein: 100 / Nitrite: Positive   Leuk Esterase: Trace / RBC: >50 /HPF / WBC 3-5   Sq Epi: x / Non Sq Epi: Moderate / Bacteria: Moderate        RADIOLOGY & ADDITIONAL STUDIES:  < from: CT Abdomen and Pelvis No Cont (22 @ 22:54) >  ACC: [id number] EXAM:  CT ABDOMEN AND PELVIS                          PROCEDURE DATE:  2022          INTERPRETATION:  CLINICAL INFORMATION: Left flank pain.    COMPARISON: None.    CONTRAST/COMPLICATIONS:  IV Contrast: NONE  Oral Contrast: NONE  Complications: None reported at time of study completion    PROCEDURE:  CT of the Abdomen and Pelvis was performed in the prone position.  Sagittal and coronal reformats were performed.    FINDINGS:    Evaluation of solid organs and vascular structures is limited without   intravenous contrast.    LOWER CHEST: Within normal limits.    LIVER: Within normal limits.  BILE DUCTS: Normal caliber.  GALLBLADDER: Within normal limits.  SPLEEN: Within normal limits.  PANCREAS: Within normal limits.  ADRENALS: Within normal limits.  KIDNEYS/URETERS: 4 mm stone in the distal left ureter with associated   mild left hydronephrosis. No right hydronephrosis or additional urinary   tract calculi.    BLADDER: Within normal limits.  REPRODUCTIVE ORGANS: Uterus and adnexa within normal limits.    BOWEL: No bowel obstruction. Appendix is normal.  PERITONEUM: No ascites.  VESSELS: Normal caliber aorta.  RETROPERITONEUM/LYMPH NODES: No lymphadenopathy.  ABDOMINAL WALL: Within normal limits.  BONES: Within normal limits.    IMPRESSION:  4 mm stone in distal left ureter with associated mild left hydronephrosis.        --- End of Report ---            HÉCTOR STODDARD MD; Attending Radiologist  This document has been electronically signed. 2022 12:30AM    < end of copied text >

## 2022-06-28 NOTE — PATIENT PROFILE ADULT - FALL HARM RISK - UNIVERSAL INTERVENTIONS
Bed in lowest position, wheels locked, appropriate side rails in place/Call bell, personal items and telephone in reach/Instruct patient to call for assistance before getting out of bed or chair/Non-slip footwear when patient is out of bed/[place] to call system/Physically safe environment - no spills, clutter or unnecessary equipment/Purposeful Proactive Rounding/Room/bathroom lighting operational, light cord in reach

## 2022-06-28 NOTE — CONSULT NOTE ADULT - ASSESSMENT
32 yo female with no PMH admitted for intractable left flank pain found to have a 4 mm left distal ureteral stone. UA with concern for UTI with elevated WBC of 17.78.     Discussed cystoscopy, ureteral stent placement with patient, risks, benefits and alternatives which include nephrostomy tube or conservative observation/ medical expulsive therapy. Patient would like to like to proceed with ureteral stent placement at this time.  Recommend  - Cystoscopy, LEFT ureteral stent placement   - NPO until procedure   - Strain urine  - Flomax  - Pain control/ IVF  - F/U cultures and sensitivities and treat accordingly  - Patient will follow up outpatient for further stone and stent management    Case discussed with Dr. Cisneros

## 2022-06-28 NOTE — H&P ADULT - ASSESSMENT
31/F with no significant PMHx presents to the ED c/o left flank pain, nausea, and difficulty urinating x 1 day.  Pt reports her urine was dark in color. Pt also had 1 episode of vomiting. Denies diarrhea and any other symptoms. Denies history of kidney stones.    CT abdo showed 4  mm distal left ureteral stone    Pt admitted with ::    Distal 4 mm Left ureteral stone   left ureteric colic  uti  mild left hydronephrosis.    PLAN:    admit to med floor  iv fluids  NPO  iv rocephin  urology consult for stent  morphine prn pain    DVT PPX: ambulation    poc discussed with pt, team, urology 31/F with no significant PMHx presents to the ED c/o left flank pain, nausea, and difficulty urinating x 1 day.  Pt reports her urine was dark in color. Pt also had 1 episode of vomiting. Denies diarrhea and any other symptoms. Denies history of kidney stones.    CT abdo showed 4  mm distal left ureteral stone    Pt admitted with ::    Distal 4 mm Left ureteral stone   left ureteric colic  uti  microscopic hematuria  mild left hydronephrosis.  leukocytosis    PLAN:    admit to med floor  iv fluids  NPO  iv rocephin  urology consult for stent  morphine prn pain    DVT PPX: ambulation    poc discussed with pt, team, urology

## 2022-06-29 LAB
ANION GAP SERPL CALC-SCNC: 6 MMOL/L — SIGNIFICANT CHANGE UP (ref 5–17)
BUN SERPL-MCNC: 6 MG/DL — LOW (ref 7–23)
CALCIUM SERPL-MCNC: 8.6 MG/DL — SIGNIFICANT CHANGE UP (ref 8.5–10.1)
CHLORIDE SERPL-SCNC: 112 MMOL/L — HIGH (ref 96–108)
CO2 SERPL-SCNC: 23 MMOL/L — SIGNIFICANT CHANGE UP (ref 22–31)
CREAT SERPL-MCNC: 0.63 MG/DL — SIGNIFICANT CHANGE UP (ref 0.5–1.3)
EGFR: 122 ML/MIN/1.73M2 — SIGNIFICANT CHANGE UP
GLUCOSE SERPL-MCNC: 126 MG/DL — HIGH (ref 70–99)
HCT VFR BLD CALC: 36.3 % — SIGNIFICANT CHANGE UP (ref 34.5–45)
HGB BLD-MCNC: 11.4 G/DL — LOW (ref 11.5–15.5)
MCHC RBC-ENTMCNC: 26 PG — LOW (ref 27–34)
MCHC RBC-ENTMCNC: 31.4 GM/DL — LOW (ref 32–36)
MCV RBC AUTO: 82.9 FL — SIGNIFICANT CHANGE UP (ref 80–100)
PLATELET # BLD AUTO: 378 K/UL — SIGNIFICANT CHANGE UP (ref 150–400)
POTASSIUM SERPL-MCNC: 3.7 MMOL/L — SIGNIFICANT CHANGE UP (ref 3.5–5.3)
POTASSIUM SERPL-SCNC: 3.7 MMOL/L — SIGNIFICANT CHANGE UP (ref 3.5–5.3)
RBC # BLD: 4.38 M/UL — SIGNIFICANT CHANGE UP (ref 3.8–5.2)
RBC # FLD: 13.8 % — SIGNIFICANT CHANGE UP (ref 10.3–14.5)
SODIUM SERPL-SCNC: 141 MMOL/L — SIGNIFICANT CHANGE UP (ref 135–145)
WBC # BLD: 11.54 K/UL — HIGH (ref 3.8–10.5)
WBC # FLD AUTO: 11.54 K/UL — HIGH (ref 3.8–10.5)

## 2022-06-29 PROCEDURE — 99232 SBSQ HOSP IP/OBS MODERATE 35: CPT

## 2022-06-29 RX ORDER — OXYBUTYNIN CHLORIDE 5 MG
5 TABLET ORAL EVERY 8 HOURS
Refills: 0 | Status: DISCONTINUED | OUTPATIENT
Start: 2022-06-29 | End: 2022-06-30

## 2022-06-29 RX ORDER — PHENAZOPYRIDINE HCL 100 MG
200 TABLET ORAL EVERY 8 HOURS
Refills: 0 | Status: DISCONTINUED | OUTPATIENT
Start: 2022-06-29 | End: 2022-06-30

## 2022-06-29 RX ORDER — POLYETHYLENE GLYCOL 3350 17 G/17G
17 POWDER, FOR SOLUTION ORAL DAILY
Refills: 0 | Status: DISCONTINUED | OUTPATIENT
Start: 2022-06-29 | End: 2022-06-30

## 2022-06-29 RX ADMIN — OXYCODONE HYDROCHLORIDE 5 MILLIGRAM(S): 5 TABLET ORAL at 09:53

## 2022-06-29 RX ADMIN — OXYCODONE HYDROCHLORIDE 5 MILLIGRAM(S): 5 TABLET ORAL at 09:07

## 2022-06-29 RX ADMIN — OXYCODONE HYDROCHLORIDE 5 MILLIGRAM(S): 5 TABLET ORAL at 00:50

## 2022-06-29 RX ADMIN — SODIUM CHLORIDE 100 MILLILITER(S): 9 INJECTION, SOLUTION INTRAVENOUS at 09:07

## 2022-06-29 RX ADMIN — Medication 400 MILLIGRAM(S): at 20:51

## 2022-06-29 RX ADMIN — CEFTRIAXONE 100 MILLIGRAM(S): 500 INJECTION, POWDER, FOR SOLUTION INTRAMUSCULAR; INTRAVENOUS at 00:42

## 2022-06-29 RX ADMIN — TAMSULOSIN HYDROCHLORIDE 0.4 MILLIGRAM(S): 0.4 CAPSULE ORAL at 22:32

## 2022-06-29 RX ADMIN — Medication 400 MILLIGRAM(S): at 21:45

## 2022-06-29 RX ADMIN — OXYCODONE HYDROCHLORIDE 5 MILLIGRAM(S): 5 TABLET ORAL at 16:04

## 2022-06-29 NOTE — PROGRESS NOTE ADULT - NS ATTEND AMEND GEN_ALL_CORE FT
Appreciate ACP help.  Pt seen and examined with YANNA Ogden; all labs and imaging reviewed together. POC discussed.  The above documentation reviewed by me and contains my recommendations.

## 2022-06-29 NOTE — PROGRESS NOTE ADULT - ASSESSMENT
30 yo female with no PMH admitted for intractable left flank pain found to have a 4 mm left distal ureteral stone. Pt is pod 1 s/p cystoscopy left ureteral stent placement. Doing well post op.     Recommend  Discharge pt with Oxybutynin 5 mg Q8hrs PRN bladder spasms, Pyridium 200 mg Q8hrs PRN dysuria, Oxycodone PRN, antibiotics as per culture and Flomax 0.4 mg QHS  - Follow up with Dr. Rubio  in 1-2 weeks for stent and stone management  - Strain urine  - F/U cultures and sensitivities and treat accordingly  - Patient will follow up outpatient for further stone and stent management    Case discussed with Dr. Cisneros

## 2022-06-29 NOTE — PROGRESS NOTE ADULT - ASSESSMENT
31F with no significant PMHx presents to the ED c/o left flank pain, nausea, vomiting, and difficulty urinating x 1 day, found to have 4 mm distal left ureteral stone with mild left hydronephrosis     E. coli UTI, Left ureteral renal stone with mild left hydronephrosis s/p cystoscopy with left ureteral stent placement   - CT A/P 4 mm stone in distal left ureter with associated mild left hydronephrosis  - WBC trending down, afebrile, hemodynamically stable  - Blood culture NGTD  - Urine Cx + E. coli, f/u sensitivities   - Continue Ceftriaxone  - Continue Flomax  - Ditropan and Pyridium PRN   - Continue IVF, stop once tolerating PO  - Pain management and antiemetics   - Urology Following     Asthma   - Albuterol PRN    DVT PPx: low risk, ambulation     Dispo: Continue IV antibiotics, ambulation  31F with no significant PMHx presents to the ED c/o left flank pain, nausea, vomiting, and difficulty urinating x 1 day, found to have 4 mm distal left ureteral stone with mild left hydronephrosis     E. coli UTI, Left ureteral renal stone with mild left hydronephrosis s/p cystoscopy with left ureteral stent placement   - CT A/P 4 mm stone in distal left ureter with associated mild left hydronephrosis  - WBC trending down, afebrile, hemodynamically stable  - Blood culture NGTD  - Urine Cx + E. coli, f/u sensitivities   - Continue Ceftriaxone  - Continue Flomax  - Ditropan and Pyridium PRN   - Continue IVF, stop once tolerating PO  - Pain management and antiemetics   - Urology Following     Asthma   - Albuterol PRN    Constipation   - Start Miralax PRN    DVT PPx: low risk, ambulation     Dispo: Continue IV antibiotics, ambulation

## 2022-06-29 NOTE — PROGRESS NOTE ADULT - SUBJECTIVE AND OBJECTIVE BOX
Patient seen at bedside, pt is pod 1 s/p cystoscopy left ureteral stent placement. Pt is doing well post op, notes some dysuria but denies any fevers, chills, n/v.    PE  General: No distress, No anxiety  VITALS  T(C): 36.4 (06-29-22 @ 09:01), Max: 37.1 (06-28-22 @ 22:32)  HR: 89 (06-29-22 @ 09:01) (60 - 89)  BP: 115/77 (06-29-22 @ 09:01) (99/40 - 121/70)  RR: 20 (06-29-22 @ 09:01) (10 - 20)  SpO2: 100% (06-29-22 @ 09:01) (98% - 100%)            Skin     : No jaundice, No lesions, No swelling  HEENT: Normocephalic, no icterus , EOM full , No epistaxis  Lung    : No resp distress  Abdo:   : Soft, Non tender, No guarding, No distension   Back    : No CVAT b/l_    Genitalia Female: No Bess  Neuro   : A&Ox3    LABS                        11.4   11.54 )-----------( 378      ( 29 Jun 2022 10:24 )             36.3   06-29    141  |  112<H>  |  6<L>  ----------------------------<  126<H>  3.7   |  23  |  0.63    Ca    8.6      29 Jun 2022 10:24    TPro  8.1  /  Alb  4.0  /  TBili  0.2  /  DBili  x   /  AST  11<L>  /  ALT  20  /  AlkPhos  68  06-27

## 2022-06-29 NOTE — PROGRESS NOTE ADULT - SUBJECTIVE AND OBJECTIVE BOX
CHIEF COMPLAINT:    HPI: 31F with no significant PMHx presents to the ED c/o left flank pain, nausea, and difficulty urinating x 1 day. Pt reports her urine was dark in color. Pt also had 1 episode of vomiting. Denies diarrhea and any other symptoms. Denies history of kidney stones.  CT abdo showed 4  mm distal left ureteral stone    Interval History:    REVIEW OF SYSTEMS: All other review of systems is negative unless indicated above.    PE:  Vital Signs Last 24 Hrs: all reviewed  T(C): 36.4 (2022 09:01), Max: 37.1 (2022 22:32)  T(F): 97.5 (2022 09:01), Max: 98.8 (2022 22:32)  HR: 89 (2022 09:) (60 - 89)  BP: 115/77 (2022 09:01) (99/40 - 121/70)  BP(mean): 94 (2022 22:32) (78 - 94)  RR: 20 (2022 09:) (10 - 20)  SpO2: 100% (2022 09:01) (98% - 100%)    Constitutional: NAD, awake and alert  HEENT: PERR, EOMI  Neck: Soft and supple,  No JVD  Respiratory: Breath sounds are clear bilaterally, No wheezing, rales or rhonchi  Cardiovascular: S1 and S2, regular rate and rhythm, no Murmurs  Gastrointestinal: Bowel Sounds present, soft, nontender, nondistended  Extremities: No peripheral edema  Vascular: 2+ peripheral pulses  Neurological: A/O x 3, no focal deficits  Musculoskeletal: 5/5 strength b/l upper and lower extremities  Skin: No rashes    LABS: All Labs Reviewed:                        11.6   12.74 )-----------( 354      ( 2022 11:26 )             37.7     06-28    142  |  114<H>  |  10  ----------------------------<  84  3.7   |  23  |  0.64    Ca    8.3<L>      2022 11:26    TPro  8.1  /  Alb  4.0  /  TBili  0.2  /  DBili  x   /  AST  11<L>  /  ALT  20  /  AlkPhos  68    HCG Quantitative, Serum (22 @ 21:28)  <1  Urinalysis Basic - ( 2022 22:04 )  Color: Red / Appearance: bloody / S.015 / pH: x  Gluc: x / Ketone: Trace  / Bili: Negative / Urobili: 1   Blood: x / Protein: 100 / Nitrite: Positive   Leuk Esterase: Trace / RBC: >50 /HPF / WBC 3-5   Sq Epi: x / Non Sq Epi: Moderate / Bacteria: Moderate  Micro:  Culture - Blood (22 @ 23:21) No growth to date.   Culture - Urine (22 @ 22:04) >100,000 CFU/ml Escherichia coli   RADIOLOGY/EKG: all reviewed:    CT Abdomen and Pelvis No Cont (22 @ 22:54)   FINDINGS:  Evaluation of solid organs and vascular structures is limited without intravenous contrast.  LOWER CHEST: Within normal limits.  LIVER: Within normal limits.  BILE DUCTS: Normal caliber.  GALLBLADDER: Within normal limits.  SPLEEN: Within normal limits.  PANCREAS: Within normal limits.  ADRENALS: Within normal limits.  KIDNEYS/URETERS: 4 mm stone in the distal left ureter with associated mild left hydronephrosis. No right hydronephrosis or additional urinary tract calculi.  BLADDER: Within normal limits.  REPRODUCTIVE ORGANS: Uterus and adnexa within normal limits.  BOWEL: No bowel obstruction. Appendix is normal.  PERITONEUM: No ascites.  VESSELS: Normal caliber aorta.  RETROPERITONEUM/LYMPH NODES: No lymphadenopathy.  ABDOMINAL WALL: Within normal limits.  BONES: Within normal limits.  IMPRESSION:  4 mm stone in distal left ureter with associated mild left hydronephrosis.    Meds:  MEDICATIONS  (STANDING):  cefTRIAXone   IVPB 1000 milliGRAM(s) IV Intermittent every 24 hours  dextrose 5% + sodium chloride 0.9%. 1000 milliLiter(s) (100 mL/Hr) IV Continuous <Continuous>  tamsulosin 0.4 milliGRAM(s) Oral at bedtime    MEDICATIONS  (PRN):  acetaminophen     Tablet .. 650 milliGRAM(s) Oral every 6 hours PRN Temp greater or equal to 38C (100.4F)  ALBUTerol    90 MICROgram(s) HFA Inhaler 2 Puff(s) Inhalation every 6 hours PRN Shortness of Breath and/or Wheezing  aluminum hydroxide/magnesium hydroxide/simethicone Suspension 30 milliLiter(s) Oral every 4 hours PRN Dyspepsia  ibuprofen  Tablet. 400 milliGRAM(s) Oral every 6 hours PRN Temp greater or equal to 38C (100.4F), Mild Pain (1 - 3), Moderate Pain (4 - 6)  melatonin 3 milliGRAM(s) Oral at bedtime PRN Insomnia  morphine  - Injectable 2 milliGRAM(s) IV Push every 4 hours PRN Severe Pain (7 - 10)  ondansetron Injectable 4 milliGRAM(s) IV Push every 6 hours PRN Nausea and/or Vomiting  oxybutynin 5 milliGRAM(s) Oral every 8 hours PRN Bladder spasms  oxyCODONE    IR 5 milliGRAM(s) Oral every 4 hours PRN Moderate Pain (4 - 6)  phenazopyridine 200 milliGRAM(s) Oral every 8 hours PRN urinary burning    Home Medications:  Albuterol (Eqv-Proventil HFA) 90 mcg/inh inhalation aerosol: 2 puff(s) inhaled every 6 hours, As Needed (2022 11:23)     CHIEF COMPLAINT: dysuria    HPI: 31F with no significant PMHx presents to the ED c/o left flank pain, nausea, and difficulty urinating x 1 day. Pt reports her urine was dark in color. Pt also had 1 episode of vomiting. Denies diarrhea and any other symptoms. Denies history of kidney stones.  CT abdo showed 4  mm distal left ureteral stone    Interval History: 22 pt seen and examined at bedside, reports +dysuria and back pain. Tolerating PO without nausea or vomiting. + constipation. Denies CP, palps, sob, cough, HA, dizziness, n/v/d, fever or chills.     REVIEW OF SYSTEMS: All other review of systems is negative unless indicated above.    PE:  Vital Signs Last 24 Hrs: all reviewed  T(C): 36.4 (2022 09:01), Max: 37.1 (2022 22:32)  T(F): 97.5 (2022 09:01), Max: 98.8 (2022 22:32)  HR: 89 (2022 09:01) (60 - 89)  BP: 115/77 (2022 09:01) (99/40 - 121/70)  BP(mean): 94 (2022 22:32) (78 - 94)  RR: 20 (2022 09:01) (10 - 20)  SpO2: 100% (2022 09:01) (98% - 100%)    Constitutional: NAD, awake and alert  HEENT:  EOMI  Neck: Soft and supple,  No JVD  Respiratory: Breath sounds are clear bilaterally, No wheezing, rales or rhonchi  Cardiovascular: S1 and S2, regular rate and rhythm, no Murmurs  Gastrointestinal: Bowel Sounds present, soft, nontender, nondistended; left flank pain   Extremities: No peripheral edema  Vascular: 2+ peripheral pulses  Neurological: A/O x 3, no focal deficits  Musculoskeletal: 5/5 strength b/l upper and lower extremities  Skin: No rashes    LABS: All Labs Reviewed:                         )-----------( 354      ( 2022 11:26 )             37.7     -    142  |  114<H>  |  10  ----------------------------<  84  3.7   |  23  |  0.64    Ca    8.3<L>      2022 11:26    TPro  8.1  /  Alb  4.0  /  TBili  0.2  /  DBili  x   /  AST  11<L>  /  ALT  20  /  AlkPhos  68    HCG Quantitative, Serum (22 @ 21:28)  <1  Urinalysis Basic - ( 2022 22:04 )  Color: Red / Appearance: bloody / S.015 / pH: x  Gluc: x / Ketone: Trace  / Bili: Negative / Urobili: 1   Blood: x / Protein: 100 / Nitrite: Positive   Leuk Esterase: Trace / RBC: >50 /HPF / WBC 3-5   Sq Epi: x / Non Sq Epi: Moderate / Bacteria: Moderate  Micro:  Culture - Blood (22 @ 23:21) No growth to date.   Culture - Urine (22 @ 22:04) >100,000 CFU/ml Escherichia coli   RADIOLOGY/EKG: all reviewed:    CT Abdomen and Pelvis No Cont (22 @ 22:54)   FINDINGS:  Evaluation of solid organs and vascular structures is limited without intravenous contrast.  LOWER CHEST: Within normal limits.  LIVER: Within normal limits.  BILE DUCTS: Normal caliber.  GALLBLADDER: Within normal limits.  SPLEEN: Within normal limits.  PANCREAS: Within normal limits.  ADRENALS: Within normal limits.  KIDNEYS/URETERS: 4 mm stone in the distal left ureter with associated mild left hydronephrosis. No right hydronephrosis or additional urinary tract calculi.  BLADDER: Within normal limits.  REPRODUCTIVE ORGANS: Uterus and adnexa within normal limits.  BOWEL: No bowel obstruction. Appendix is normal.  PERITONEUM: No ascites.  VESSELS: Normal caliber aorta.  RETROPERITONEUM/LYMPH NODES: No lymphadenopathy.  ABDOMINAL WALL: Within normal limits.  BONES: Within normal limits.  IMPRESSION:  4 mm stone in distal left ureter with associated mild left hydronephrosis.    Meds:  MEDICATIONS  (STANDING):  cefTRIAXone   IVPB 1000 milliGRAM(s) IV Intermittent every 24 hours  dextrose 5% + sodium chloride 0.9%. 1000 milliLiter(s) (100 mL/Hr) IV Continuous <Continuous>  tamsulosin 0.4 milliGRAM(s) Oral at bedtime    MEDICATIONS  (PRN):  acetaminophen     Tablet .. 650 milliGRAM(s) Oral every 6 hours PRN Temp greater or equal to 38C (100.4F)  ALBUTerol    90 MICROgram(s) HFA Inhaler 2 Puff(s) Inhalation every 6 hours PRN Shortness of Breath and/or Wheezing  aluminum hydroxide/magnesium hydroxide/simethicone Suspension 30 milliLiter(s) Oral every 4 hours PRN Dyspepsia  ibuprofen  Tablet. 400 milliGRAM(s) Oral every 6 hours PRN Temp greater or equal to 38C (100.4F), Mild Pain (1 - 3), Moderate Pain (4 - 6)  melatonin 3 milliGRAM(s) Oral at bedtime PRN Insomnia  morphine  - Injectable 2 milliGRAM(s) IV Push every 4 hours PRN Severe Pain (7 - 10)  ondansetron Injectable 4 milliGRAM(s) IV Push every 6 hours PRN Nausea and/or Vomiting  oxybutynin 5 milliGRAM(s) Oral every 8 hours PRN Bladder spasms  oxyCODONE    IR 5 milliGRAM(s) Oral every 4 hours PRN Moderate Pain (4 - 6)  phenazopyridine 200 milliGRAM(s) Oral every 8 hours PRN urinary burning    Home Medications:  Albuterol (Eqv-Proventil HFA) 90 mcg/inh inhalation aerosol: 2 puff(s) inhaled every 6 hours, As Needed (2022 11:23)     Yes

## 2022-06-30 ENCOUNTER — TRANSCRIPTION ENCOUNTER (OUTPATIENT)
Age: 32
End: 2022-06-30

## 2022-06-30 VITALS
HEART RATE: 69 BPM | OXYGEN SATURATION: 99 % | SYSTOLIC BLOOD PRESSURE: 126 MMHG | DIASTOLIC BLOOD PRESSURE: 74 MMHG | TEMPERATURE: 99 F | RESPIRATION RATE: 18 BRPM

## 2022-06-30 LAB
-  AMIKACIN: SIGNIFICANT CHANGE UP
-  AMOXICILLIN/CLAVULANIC ACID: SIGNIFICANT CHANGE UP
-  AMPICILLIN/SULBACTAM: SIGNIFICANT CHANGE UP
-  AMPICILLIN: SIGNIFICANT CHANGE UP
-  AZTREONAM: SIGNIFICANT CHANGE UP
-  CEFAZOLIN: SIGNIFICANT CHANGE UP
-  CEFEPIME: SIGNIFICANT CHANGE UP
-  CEFOXITIN: SIGNIFICANT CHANGE UP
-  CEFTRIAXONE: SIGNIFICANT CHANGE UP
-  CIPROFLOXACIN: SIGNIFICANT CHANGE UP
-  ERTAPENEM: SIGNIFICANT CHANGE UP
-  GENTAMICIN: SIGNIFICANT CHANGE UP
-  IMIPENEM: SIGNIFICANT CHANGE UP
-  LEVOFLOXACIN: SIGNIFICANT CHANGE UP
-  MEROPENEM: SIGNIFICANT CHANGE UP
-  NITROFURANTOIN: SIGNIFICANT CHANGE UP
-  PIPERACILLIN/TAZOBACTAM: SIGNIFICANT CHANGE UP
-  TIGECYCLINE: SIGNIFICANT CHANGE UP
-  TOBRAMYCIN: SIGNIFICANT CHANGE UP
-  TRIMETHOPRIM/SULFAMETHOXAZOLE: SIGNIFICANT CHANGE UP
ANION GAP SERPL CALC-SCNC: 5 MMOL/L — SIGNIFICANT CHANGE UP (ref 5–17)
BUN SERPL-MCNC: 6 MG/DL — LOW (ref 7–23)
CALCIUM SERPL-MCNC: 8.4 MG/DL — LOW (ref 8.5–10.1)
CHLORIDE SERPL-SCNC: 112 MMOL/L — HIGH (ref 96–108)
CO2 SERPL-SCNC: 26 MMOL/L — SIGNIFICANT CHANGE UP (ref 22–31)
CREAT SERPL-MCNC: 0.64 MG/DL — SIGNIFICANT CHANGE UP (ref 0.5–1.3)
CULTURE RESULTS: SIGNIFICANT CHANGE UP
EGFR: 122 ML/MIN/1.73M2 — SIGNIFICANT CHANGE UP
GLUCOSE SERPL-MCNC: 92 MG/DL — SIGNIFICANT CHANGE UP (ref 70–99)
HCT VFR BLD CALC: 33.9 % — LOW (ref 34.5–45)
HGB BLD-MCNC: 10.9 G/DL — LOW (ref 11.5–15.5)
MCHC RBC-ENTMCNC: 26.5 PG — LOW (ref 27–34)
MCHC RBC-ENTMCNC: 32.2 GM/DL — SIGNIFICANT CHANGE UP (ref 32–36)
MCV RBC AUTO: 82.3 FL — SIGNIFICANT CHANGE UP (ref 80–100)
METHOD TYPE: SIGNIFICANT CHANGE UP
ORGANISM # SPEC MICROSCOPIC CNT: SIGNIFICANT CHANGE UP
ORGANISM # SPEC MICROSCOPIC CNT: SIGNIFICANT CHANGE UP
PLATELET # BLD AUTO: 351 K/UL — SIGNIFICANT CHANGE UP (ref 150–400)
POTASSIUM SERPL-MCNC: 3.6 MMOL/L — SIGNIFICANT CHANGE UP (ref 3.5–5.3)
POTASSIUM SERPL-SCNC: 3.6 MMOL/L — SIGNIFICANT CHANGE UP (ref 3.5–5.3)
RBC # BLD: 4.12 M/UL — SIGNIFICANT CHANGE UP (ref 3.8–5.2)
RBC # FLD: 13.9 % — SIGNIFICANT CHANGE UP (ref 10.3–14.5)
SODIUM SERPL-SCNC: 143 MMOL/L — SIGNIFICANT CHANGE UP (ref 135–145)
SPECIMEN SOURCE: SIGNIFICANT CHANGE UP
WBC # BLD: 11.03 K/UL — HIGH (ref 3.8–10.5)
WBC # FLD AUTO: 11.03 K/UL — HIGH (ref 3.8–10.5)

## 2022-06-30 PROCEDURE — 99239 HOSP IP/OBS DSCHRG MGMT >30: CPT

## 2022-06-30 RX ORDER — TAMSULOSIN HYDROCHLORIDE 0.4 MG/1
1 CAPSULE ORAL
Qty: 30 | Refills: 0
Start: 2022-06-30 | End: 2022-07-29

## 2022-06-30 RX ORDER — ACETAMINOPHEN 500 MG
2 TABLET ORAL
Qty: 0 | Refills: 0 | DISCHARGE
Start: 2022-06-30

## 2022-06-30 RX ORDER — POLYETHYLENE GLYCOL 3350 17 G/17G
17 POWDER, FOR SOLUTION ORAL
Qty: 0 | Refills: 0 | DISCHARGE
Start: 2022-06-30

## 2022-06-30 RX ORDER — PHENAZOPYRIDINE HCL 100 MG
1 TABLET ORAL
Qty: 9 | Refills: 0
Start: 2022-06-30 | End: 2022-07-02

## 2022-06-30 RX ORDER — CEFUROXIME AXETIL 250 MG
1 TABLET ORAL
Qty: 10 | Refills: 0
Start: 2022-06-30 | End: 2022-07-04

## 2022-06-30 RX ORDER — OXYBUTYNIN CHLORIDE 5 MG
1 TABLET ORAL
Qty: 9 | Refills: 0
Start: 2022-06-30 | End: 2022-07-02

## 2022-06-30 RX ADMIN — MORPHINE SULFATE 2 MILLIGRAM(S): 50 CAPSULE, EXTENDED RELEASE ORAL at 01:00

## 2022-06-30 RX ADMIN — OXYCODONE HYDROCHLORIDE 5 MILLIGRAM(S): 5 TABLET ORAL at 15:22

## 2022-06-30 RX ADMIN — OXYCODONE HYDROCHLORIDE 5 MILLIGRAM(S): 5 TABLET ORAL at 15:48

## 2022-06-30 RX ADMIN — CEFTRIAXONE 100 MILLIGRAM(S): 500 INJECTION, POWDER, FOR SOLUTION INTRAMUSCULAR; INTRAVENOUS at 01:30

## 2022-06-30 RX ADMIN — MORPHINE SULFATE 2 MILLIGRAM(S): 50 CAPSULE, EXTENDED RELEASE ORAL at 00:09

## 2022-06-30 RX ADMIN — Medication 30 MILLILITER(S): at 01:35

## 2022-06-30 NOTE — DISCHARGE NOTE NURSING/CASE MANAGEMENT/SOCIAL WORK - PATIENT PORTAL LINK FT
You can access the FollowMyHealth Patient Portal offered by [facility] by registering at the following website: [email or web address]. By joining [employer] [employer]’s FollowMyHealth portal, you will also be able to view your health information using other applications (apps) compatible with our system.

## 2022-06-30 NOTE — DISCHARGE NOTE PROVIDER - CARE PROVIDERS DIRECT ADDRESSES
,[email or web address],DirectAddress_Unknown

## 2022-06-30 NOTE — DISCHARGE NOTE PROVIDER - PROVIDER TOKENS
PROVIDER:[TOKEN:[[id number]],FOLLOWUP:[1 week]],FREE:[LAST:[[facility]],PHONE:[phone]],FAX:[(   )    -],ADDRESS:[[address] [place] [address]],FOLLOWUP:[1 week]]

## 2022-06-30 NOTE — DISCHARGE NOTE PROVIDER - HOSPITAL COURSE
CHIEF COMPLAINT: dysuria    HPI: 31F with no significant PMHx presents to the ED c/o left flank pain, nausea, and difficulty urinating x 1 day. Pt reports her urine was dark in color. Pt also had 1 episode of vomiting. Denies diarrhea and any other symptoms. Denies history of kidney stones.  CT abdo showed 4  mm distal left ureteral stone    Interval History: 6/30/22 pt seen and examined at bedside, reports improvement in dysuria and back pain. Tolerating PO without nausea or vomiting. + constipation. Denies CP, palps, sob, cough, HA, dizziness, n/v/d, fever or chills.     REVIEW OF SYSTEMS: All other review of systems is negative unless indicated above.    PE:  Vital Signs Last 24 Hrs: all reviewed  T(C): 36.9 (30 Jun 2022 07:41), Max: 37.1 (29 Jun 2022 16:14)  T(F): 98.4 (30 Jun 2022 07:41), Max: 98.8 (29 Jun 2022 16:14)  HR: 68 (30 Jun 2022 07:41) (65 - 106)  BP: 110/74 (30 Jun 2022 07:41) (110/74 - 130/73)  RR: 18 (30 Jun 2022 07:41) (18 - 18)  SpO2: 97% (30 Jun 2022 07:41) (97% - 100%)    Constitutional: NAD, awake and alert  HEENT:  EOMI  Neck: Soft and supple, No JVD  Respiratory: Breath sounds are clear bilaterally, No wheezing, rales or rhonchi  Cardiovascular: S1 and S2, regular rate and rhythm, no Murmurs  Gastrointestinal: Bowel Sounds present, soft, nontender, nondistended; left flank pain   Extremities: No peripheral edema  Vascular: 2+ peripheral pulses  Neurological: A/O x 3, no focal deficits  Musculoskeletal: 5/5 strength b/l upper and lower extremities  Skin: No rashes    Hospital Course:  E. coli UTI, Left ureteral renal stone with mild left hydronephrosis s/p cystoscopy with left ureteral stent placement   - CT A/P 4 mm stone in distal left ureter with associated mild left hydronephrosis  - WBC trending down, afebrile, hemodynamically stable  - Blood culture NGTD  - Urine Cx + E. coli, sensitive to cephalosporins   - s/p Ceftriaxone; DC home on Ceftin 500mg PO BID x 5 more days to complete 7 day course   - Continue Flomax  - Ditropan and Pyridium PRN   - Continue IVF, stop once tolerating PO  - Pain management and antiemetics   - Urology Following     Asthma   - Albuterol PRN    Constipation   - Start Miralax PRN    Dispo: discharge to home in stable condition    Final diagnosis, treatment plan, and follow-up recommendations were discussed and explained to the patient. The patient was given an opportunity to ask questions concerning the diagnosis and treatment plan. The patient acknowledged understanding of the diagnosis, treatment, and follow-up recommendations. The patient was advised to seek urgent care upon discharge if worsening symptoms develop prior to scheduled follow-up. Time spent on discharge included time with the patient, and also coordinating discharge care as outlined below.     CHIEF COMPLAINT: dysuria    HPI: 31F with no significant PMHx presents to the ED c/o left flank pain, nausea, and difficulty urinating x 1 day. Pt reports her urine was dark in color. Pt also had 1 episode of vomiting. Denies diarrhea and any other symptoms. Denies history of kidney stones.  CT abdo showed 4  mm distal left ureteral stone    Interval History: 6/30/22 pt seen and examined at bedside, reports improvement in dysuria and back pain. Tolerating PO without nausea or vomiting. + constipation. Denies CP, palps, sob, cough, HA, dizziness, n/v/d, fever or chills.     REVIEW OF SYSTEMS: All other review of systems is negative unless indicated above.    PE:  Vital Signs Last 24 Hrs: all reviewed  T(C): 36.9 (30 Jun 2022 07:41), Max: 37.1 (29 Jun 2022 16:14)  T(F): 98.4 (30 Jun 2022 07:41), Max: 98.8 (29 Jun 2022 16:14)  HR: 68 (30 Jun 2022 07:41) (65 - 106)  BP: 110/74 (30 Jun 2022 07:41) (110/74 - 130/73)  RR: 18 (30 Jun 2022 07:41) (18 - 18)  SpO2: 97% (30 Jun 2022 07:41) (97% - 100%)    Constitutional: NAD, awake and alert  HEENT:  EOMI  Neck: Soft and supple, No JVD  Respiratory: Breath sounds are clear bilaterally, No wheezing, rales or rhonchi  Cardiovascular: S1 and S2, regular rate and rhythm, no Murmurs  Gastrointestinal: Bowel Sounds present, soft, nontender, nondistended; left flank pain   Extremities: No peripheral edema  Vascular: 2+ peripheral pulses  Neurological: A/O x 3, no focal deficits  Musculoskeletal: 5/5 strength b/l upper and lower extremities  Skin: No rashes    Hospital Course:  E. coli UTI, Left ureteral renal stone with mild left hydronephrosis s/p cystoscopy with left ureteral stent placement   - CT A/P 4 mm stone in distal left ureter with associated mild left hydronephrosis  - WBC trending down, afebrile, hemodynamically stable  - Blood culture NGTD  - Urine Cx + E. coli, sensitive to cephalosporins   - s/p Ceftriaxone; DC home on Ceftin 500mg PO BID x 5 more days to complete 7 day course   - Continue Flomax  - Ditropan and Pyridium PRN   - Continue IVF, stop once tolerating PO  - Pain management and antiemetics   - Urology Following     Asthma   - Albuterol PRN    Constipation   - Start Miralax PRN    Dispo: discharge to home in stable condition    Final diagnosis, treatment plan, and follow-up recommendations were discussed and explained to the patient. The patient was given an opportunity to ask questions concerning the diagnosis and treatment plan. The patient acknowledged understanding of the diagnosis, treatment, and follow-up recommendations. The patient was advised to seek urgent care upon discharge if worsening symptoms develop prior to scheduled follow-up. Time spent on discharge included time with the patient, and also coordinating discharge care as outlined below.    Appreciate ACP help.  Pt seen and examined with NP Madalyn Ogden; all labs and imaging reviewed together. POC discussed.  The above documentation reviewed by me and contains my recommendations.

## 2022-06-30 NOTE — DISCHARGE NOTE PROVIDER - NSDCCPCAREPLAN_GEN_ALL_CORE_FT
PRINCIPAL DISCHARGE DIAGNOSIS  Diagnosis: Kidney stone  Assessment and Plan of Treatment: You had a kidney stone that caused an obstruction and a urinary tract infection  - You had a cystoscopy and a left ureteral stent placed with Dr. Tristian Rubio  - You were treated with a course of IV antibiotics  - Start taking Ceftin as directed, complete the course even if you are feeling better  - Continue Flomax as directed   - Continue Ditropan as needed for urinary urgency  - Continue Pyridium as needed for burning urination   - Take tylenol as needed for pain  - Follow up with Dr. Rubio in one week  - Follow up with your PCP in one week to go over the details of your hospitalization  - If you develop fever, chills, worsening pain, worsening blood in urine call your PCP asap or return to the ER        SECONDARY DISCHARGE DIAGNOSES  Diagnosis: Asthma  Assessment and Plan of Treatment: - Albuterol inhaler as needed       Diagnosis: Constipation  Assessment and Plan of Treatment: - Miralax as needed

## 2022-06-30 NOTE — DISCHARGE NOTE PROVIDER - ATTENDING DISCHARGE PHYSICAL EXAMINATION:
PHYSICAL EXAM:  Vital Signs Last 24 Hrs  T(C): 36.9 (30 Jun 2022 07:41), Max: 37.1 (29 Jun 2022 16:14)  T(F): 98.4 (30 Jun 2022 07:41), Max: 98.8 (29 Jun 2022 16:14)  HR: 68 (30 Jun 2022 07:41) (65 - 72)  BP: 110/74 (30 Jun 2022 07:41) (110/74 - 130/73)  RR: 18 (30 Jun 2022 07:41) (18 - 18)  SpO2: 97% (30 Jun 2022 07:41) (97% - 100%)  GENERAL: NAD, able to lie flat in bed  HEAD:  Atraumatic, Normocephalic  EYES: EOMI, PERRLA, normal sclera  ENT: Moist mucous membranes  NECK: Supple, No JVD, no nuchal rigidity  CHEST/LUNG: Clear to auscultation bilaterally; No rales, rhonchi, wheezing, or rubs. Unlabored respirations  HEART: Regular rate and rhythm; No murmurs, rubs, or gallops  ABDOMEN: Bowel sounds present; Soft, Nontender, Nondistended. No hepatomegaly  EXTREMITIES:  no pitting bilaterally  NERVOUS SYSTEM:  Alert & Oriented X3, speech clear. No focal motor or sensory deficits  MSK: FROM all 4 extremities, full and equal strength  SKIN: No rashes or lesions

## 2022-06-30 NOTE — DISCHARGE NOTE NURSING/CASE MANAGEMENT/SOCIAL WORK - NSDCPEFALRISK_GEN_ALL_CORE
For information on Fall & Injury Prevention, visit: [email or web address]  [email or web address] OR  https://www.cdc.gov/steadi/patient.html

## 2022-06-30 NOTE — DISCHARGE NOTE PROVIDER - NSDCMRMEDTOKEN_GEN_ALL_CORE_FT
acetaminophen 325 mg oral tablet: 2 tab(s) orally every 6 hours, As needed, Temp greater or equal to 38C (100.4F)  Albuterol (Eqv-Proventil HFA) 90 mcg/inh inhalation aerosol: 2 puff(s) inhaled every 6 hours, As Needed  cefuroxime 500 mg oral tablet: 1 tab(s) orally 2 times a day   oxybutynin 5 mg oral tablet: 1 tab(s) orally every 8 hours, As needed, Bladder spasms  phenazopyridine 200 mg oral tablet: 1 tab(s) orally every 8 hours, As needed, urinary burning  polyethylene glycol 3350 oral powder for reconstitution: 17 gram(s) orally once a day, As needed, Constipation  tamsulosin 0.4 mg oral capsule: 1 cap(s) orally once a day (at bedtime)

## 2022-07-03 LAB
CULTURE RESULTS: SIGNIFICANT CHANGE UP
CULTURE RESULTS: SIGNIFICANT CHANGE UP
SPECIMEN SOURCE: SIGNIFICANT CHANGE UP
SPECIMEN SOURCE: SIGNIFICANT CHANGE UP

## 2022-07-06 DIAGNOSIS — N13.6 PYONEPHROSIS: ICD-10-CM

## 2022-07-06 DIAGNOSIS — J45.909 UNSPECIFIED ASTHMA, UNCOMPLICATED: ICD-10-CM

## 2022-07-06 DIAGNOSIS — B96.20 UNSPECIFIED ESCHERICHIA COLI [E. COLI] AS THE CAUSE OF DISEASES CLASSIFIED ELSEWHERE: ICD-10-CM

## 2022-07-06 DIAGNOSIS — K59.00 CONSTIPATION, UNSPECIFIED: ICD-10-CM

## 2022-07-06 DIAGNOSIS — Z79.51 LONG TERM (CURRENT) USE OF INHALED STEROIDS: ICD-10-CM

## 2022-07-06 DIAGNOSIS — F12.90 CANNABIS USE, UNSPECIFIED, UNCOMPLICATED: ICD-10-CM

## 2022-07-06 DIAGNOSIS — R31.29 OTHER MICROSCOPIC HEMATURIA: ICD-10-CM

## 2022-07-07 ENCOUNTER — APPOINTMENT (OUTPATIENT)
Dept: UROLOGY | Facility: CLINIC | Age: 32
End: 2022-07-07

## 2022-07-07 VITALS
DIASTOLIC BLOOD PRESSURE: 75 MMHG | OXYGEN SATURATION: 97 % | HEIGHT: 63 IN | WEIGHT: 160 LBS | SYSTOLIC BLOOD PRESSURE: 113 MMHG | HEART RATE: 78 BPM | BODY MASS INDEX: 28.35 KG/M2

## 2022-07-07 DIAGNOSIS — Z78.9 OTHER SPECIFIED HEALTH STATUS: ICD-10-CM

## 2022-07-07 DIAGNOSIS — Z84.1 FAMILY HISTORY OF DISORDERS OF KIDNEY AND URETER: ICD-10-CM

## 2022-07-07 PROCEDURE — 99213 OFFICE O/P EST LOW 20 MIN: CPT

## 2022-07-07 RX ORDER — ALBUTEROL SULFATE 90 UG/1
108 (90 BASE) INHALANT RESPIRATORY (INHALATION)
Qty: 7 | Refills: 0 | Status: ACTIVE | COMMUNITY
Start: 2021-12-08

## 2022-07-07 RX ORDER — PHENAZOPYRIDINE HYDROCHLORIDE 200 MG/1
200 TABLET ORAL
Qty: 9 | Refills: 0 | Status: ACTIVE | COMMUNITY
Start: 2022-06-30

## 2022-07-07 RX ORDER — TAMSULOSIN HYDROCHLORIDE 0.4 MG/1
0.4 CAPSULE ORAL
Qty: 30 | Refills: 0 | Status: ACTIVE | COMMUNITY
Start: 2022-06-30

## 2022-07-07 RX ORDER — OXYBUTYNIN CHLORIDE 5 MG/1
5 TABLET ORAL
Qty: 9 | Refills: 0 | Status: ACTIVE | COMMUNITY
Start: 2022-06-30

## 2022-07-07 RX ORDER — ONDANSETRON 4 MG/1
4 TABLET ORAL
Qty: 8 | Refills: 0 | Status: ACTIVE | COMMUNITY
Start: 2022-04-29

## 2022-07-07 RX ORDER — IBUPROFEN 800 MG/1
800 TABLET, FILM COATED ORAL
Qty: 15 | Refills: 0 | Status: ACTIVE | COMMUNITY
Start: 2022-04-29

## 2022-07-07 RX ORDER — PROGESTERONE 200 MG/1
200 CAPSULE ORAL
Qty: 34 | Refills: 0 | Status: ACTIVE | COMMUNITY
Start: 2022-05-01

## 2022-07-07 RX ORDER — CEFUROXIME AXETIL 500 MG/1
500 TABLET ORAL
Qty: 10 | Refills: 0 | Status: ACTIVE | COMMUNITY
Start: 2022-06-30

## 2022-07-07 NOTE — REVIEW OF SYSTEMS
[Eyesight Problems] : eyesight problems [Dry Eyes] : dryness of the eyes [Shortness Of Breath] : shortness of breath [Wheezing] : wheezing [Negative] : Heme/Lymph

## 2022-07-07 NOTE — HISTORY OF PRESENT ILLNESS
[FreeTextEntry1] : 31 year old woman seen 07/07/2022 with left ureteral stone. Pt was seen at  for intractable left flank pain found to have 4 mm left distal ureteral stone s/p cystoscopy left ureteral stent placement. noted with a UTI UCx >100K E coli, treated with antibiotics. Presents to discuss stone/stent management. Patient is doing well with the stent and denies any symptoms. \par It is mild to moderate in severity. Nothing makes the symptoms better, nothing makes sx worse. \par It is associated with nothing.\par No hematuria, no dysuria, no frequency, no urgency, no hesitancy, no straining. No incontinence. \par No fevers, no chills, no nausea, no vomiting, no flank pain. \par \par No family history contributory to kidney stones.\par

## 2022-07-07 NOTE — ASSESSMENT
[FreeTextEntry1] : 32 yo female with 4 mm left ureteral stone and UTI s/p left ureteral stent placement and abx. Here to discuss stone/stent  management\par With regards to renal calculi, we discussed several treatment options. We discussed medical expulsive therapy with alpha blocker to aid passage with pain medication and antiemetics for comfort. We also discussed the risks and benefits of ESWL. Benefits include noninvasive treatment and moderate anesthesia requirement, but risks include ineffective lithotripsy, requirement for subsequent procedures, hematoma, and steinstrasse. Finally, we discussed ureteroscopy with benefits including higher clearance rate of stones, direct visualization, concurrent stent placement, and possible stone extraction allowing for stone analysis. Risks include trauma to urethra, bladder, ureter, or kidney, hematoma, infection, ureteral stricture.\par Patient would like to proceed with ureteroscopy lithotripsy at this time. If pt passes stone prior to surgery, will send for CT and consider cancelling surgery.\par \par

## 2022-07-30 ENCOUNTER — APPOINTMENT (OUTPATIENT)
Dept: CT IMAGING | Facility: CLINIC | Age: 32
End: 2022-07-30

## 2022-08-03 ENCOUNTER — OUTPATIENT (OUTPATIENT)
Dept: OUTPATIENT SERVICES | Facility: HOSPITAL | Age: 32
LOS: 1 days | End: 2022-08-03
Payer: MEDICAID

## 2022-08-03 ENCOUNTER — RESULT REVIEW (OUTPATIENT)
Age: 32
End: 2022-08-03

## 2022-08-03 VITALS
DIASTOLIC BLOOD PRESSURE: 61 MMHG | TEMPERATURE: 98 F | WEIGHT: 158.73 LBS | HEART RATE: 76 BPM | HEIGHT: 63 IN | SYSTOLIC BLOOD PRESSURE: 111 MMHG | OXYGEN SATURATION: 99 % | RESPIRATION RATE: 16 BRPM

## 2022-08-03 DIAGNOSIS — Z98.891 HISTORY OF UTERINE SCAR FROM PREVIOUS SURGERY: Chronic | ICD-10-CM

## 2022-08-03 DIAGNOSIS — N20.1 CALCULUS OF URETER: ICD-10-CM

## 2022-08-03 DIAGNOSIS — S02.609A FRACTURE OF MANDIBLE, UNSPECIFIED, INITIAL ENCOUNTER FOR CLOSED FRACTURE: Chronic | ICD-10-CM

## 2022-08-03 DIAGNOSIS — Z01.818 ENCOUNTER FOR OTHER PREPROCEDURAL EXAMINATION: ICD-10-CM

## 2022-08-03 DIAGNOSIS — Z98.890 OTHER SPECIFIED POSTPROCEDURAL STATES: Chronic | ICD-10-CM

## 2022-08-03 PROCEDURE — 85730 THROMBOPLASTIN TIME PARTIAL: CPT

## 2022-08-03 PROCEDURE — 87086 URINE CULTURE/COLONY COUNT: CPT

## 2022-08-03 PROCEDURE — 99214 OFFICE O/P EST MOD 30 MIN: CPT | Mod: 25

## 2022-08-03 PROCEDURE — 85610 PROTHROMBIN TIME: CPT

## 2022-08-03 PROCEDURE — 81001 URINALYSIS AUTO W/SCOPE: CPT

## 2022-08-03 PROCEDURE — 36415 COLL VENOUS BLD VENIPUNCTURE: CPT

## 2022-08-03 PROCEDURE — 71046 X-RAY EXAM CHEST 2 VIEWS: CPT

## 2022-08-03 PROCEDURE — 86850 RBC ANTIBODY SCREEN: CPT

## 2022-08-03 PROCEDURE — 93010 ELECTROCARDIOGRAM REPORT: CPT

## 2022-08-03 PROCEDURE — 93005 ELECTROCARDIOGRAM TRACING: CPT

## 2022-08-03 PROCEDURE — 86901 BLOOD TYPING SEROLOGIC RH(D): CPT

## 2022-08-03 PROCEDURE — 80048 BASIC METABOLIC PNL TOTAL CA: CPT

## 2022-08-03 PROCEDURE — 71046 X-RAY EXAM CHEST 2 VIEWS: CPT | Mod: 26

## 2022-08-03 PROCEDURE — 86900 BLOOD TYPING SEROLOGIC ABO: CPT

## 2022-08-03 PROCEDURE — 85025 COMPLETE CBC W/AUTO DIFF WBC: CPT

## 2022-08-03 NOTE — H&P PST ADULT - HISTORY OF PRESENT ILLNESS
31 y.o WD, WN  31 y.o WD, WN pleasant female presents to PST with hx of severe left flank pain in June 2022. She went to ER and diagnostics revealed a large left ureteral stone. She had a cystoscopy with left ureteral stent placed. Patient currently denies hematuria, dysuria or flank pain. She has followed with surgeon and now scheduled for Cystoscopy, Left Ureteroscopy, Laser Lithotripsy, Left Ureteral Stent Exchange

## 2022-08-03 NOTE — H&P PST ADULT - ASSESSMENT
31 y.o female scheduled  31 y.o female scheduled  Cystoscopy, Left Ureteroscopy, Laser Lithotripsy, Left Ureteral Stent Exchange   Plan  1. Stop all NSAIDS, herbal supplements and vitamins for 7 days.  2. NPO at midnight.  3. Take the following medications --none-- with small sips of water on the morning of your procedure/surgery.  4. Labs, EKG, CXR as per surgeon  5. COVID swab to be done 8/9/2022  6. UCG STAT on admit

## 2022-08-03 NOTE — H&P PST ADULT - NSICDXPASTSURGICALHX_GEN_ALL_CORE_FT
PAST SURGICAL HISTORY:  Fracture, mandible 2019    H/O  section 2016     PAST SURGICAL HISTORY:  Fracture, mandible 2019    H/O  section 2016    H/O cystoscopy Left Ureteral Stent placed--2022

## 2022-08-04 DIAGNOSIS — N20.1 CALCULUS OF URETER: ICD-10-CM

## 2022-08-04 DIAGNOSIS — Z01.818 ENCOUNTER FOR OTHER PREPROCEDURAL EXAMINATION: ICD-10-CM

## 2022-08-04 LAB
CULTURE RESULTS: SIGNIFICANT CHANGE UP
SPECIMEN SOURCE: SIGNIFICANT CHANGE UP

## 2022-08-05 NOTE — BRIEF OPERATIVE NOTE - PRIMARY SURGEON
Dr. Cisneros SSKI Counseling:  I discussed with the patient the risks of SSKI including but not limited to thyroid abnormalities, metallic taste, GI upset, fever, headache, acne, arthralgias, paraesthesias, lymphadenopathy, easy bleeding, arrhythmias, and allergic reaction.

## 2022-08-08 LAB
ANION GAP SERPL CALC-SCNC: 7 MMOL/L — SIGNIFICANT CHANGE UP (ref 5–17)
APPEARANCE UR: CLEAR — SIGNIFICANT CHANGE UP
APTT BLD: 30.6 SEC — SIGNIFICANT CHANGE UP (ref 27.5–35.5)
BASOPHILS # BLD AUTO: 0.08 K/UL — SIGNIFICANT CHANGE UP (ref 0–0.2)
BASOPHILS NFR BLD AUTO: 0.7 % — SIGNIFICANT CHANGE UP (ref 0–2)
BILIRUB UR-MCNC: NEGATIVE — SIGNIFICANT CHANGE UP
BUN SERPL-MCNC: 17 MG/DL — SIGNIFICANT CHANGE UP (ref 7–23)
CALCIUM SERPL-MCNC: 9.4 MG/DL — SIGNIFICANT CHANGE UP (ref 8.5–10.1)
CHLORIDE SERPL-SCNC: 107 MMOL/L — SIGNIFICANT CHANGE UP (ref 96–108)
CO2 SERPL-SCNC: 26 MMOL/L — SIGNIFICANT CHANGE UP (ref 22–31)
COLOR SPEC: YELLOW — SIGNIFICANT CHANGE UP
CREAT SERPL-MCNC: 0.77 MG/DL — SIGNIFICANT CHANGE UP (ref 0.5–1.3)
DIFF PNL FLD: ABNORMAL
EGFR: 106 ML/MIN/1.73M2 — SIGNIFICANT CHANGE UP
EOSINOPHIL # BLD AUTO: 0.26 K/UL — SIGNIFICANT CHANGE UP (ref 0–0.5)
EOSINOPHIL NFR BLD AUTO: 2.4 % — SIGNIFICANT CHANGE UP (ref 0–6)
GLUCOSE SERPL-MCNC: 94 MG/DL — SIGNIFICANT CHANGE UP (ref 70–99)
GLUCOSE UR QL: NEGATIVE — SIGNIFICANT CHANGE UP
HCT VFR BLD CALC: 40.8 % — SIGNIFICANT CHANGE UP (ref 34.5–45)
HGB BLD-MCNC: 12.9 G/DL — SIGNIFICANT CHANGE UP (ref 11.5–15.5)
IMM GRANULOCYTES NFR BLD AUTO: 0.4 % — SIGNIFICANT CHANGE UP (ref 0–1.5)
INR BLD: 1.14 RATIO — SIGNIFICANT CHANGE UP (ref 0.88–1.16)
KETONES UR-MCNC: NEGATIVE — SIGNIFICANT CHANGE UP
LEUKOCYTE ESTERASE UR-ACNC: ABNORMAL
LYMPHOCYTES # BLD AUTO: 2.87 K/UL — SIGNIFICANT CHANGE UP (ref 1–3.3)
LYMPHOCYTES # BLD AUTO: 26.5 % — SIGNIFICANT CHANGE UP (ref 13–44)
MCHC RBC-ENTMCNC: 25.6 PG — LOW (ref 27–34)
MCHC RBC-ENTMCNC: 31.6 GM/DL — LOW (ref 32–36)
MCV RBC AUTO: 81.1 FL — SIGNIFICANT CHANGE UP (ref 80–100)
MONOCYTES # BLD AUTO: 0.77 K/UL — SIGNIFICANT CHANGE UP (ref 0–0.9)
MONOCYTES NFR BLD AUTO: 7.1 % — SIGNIFICANT CHANGE UP (ref 2–14)
NEUTROPHILS # BLD AUTO: 6.82 K/UL — SIGNIFICANT CHANGE UP (ref 1.8–7.4)
NEUTROPHILS NFR BLD AUTO: 62.9 % — SIGNIFICANT CHANGE UP (ref 43–77)
NITRITE UR-MCNC: NEGATIVE — SIGNIFICANT CHANGE UP
PH UR: 6 — SIGNIFICANT CHANGE UP (ref 5–8)
PLATELET # BLD AUTO: 378 K/UL — SIGNIFICANT CHANGE UP (ref 150–400)
POTASSIUM SERPL-MCNC: 3.6 MMOL/L — SIGNIFICANT CHANGE UP (ref 3.5–5.3)
POTASSIUM SERPL-SCNC: 3.6 MMOL/L — SIGNIFICANT CHANGE UP (ref 3.5–5.3)
PROT UR-MCNC: 100
PROTHROM AB SERPL-ACNC: 13.2 SEC — SIGNIFICANT CHANGE UP (ref 10.5–13.4)
RBC # BLD: 5.03 M/UL — SIGNIFICANT CHANGE UP (ref 3.8–5.2)
RBC # FLD: 14 % — SIGNIFICANT CHANGE UP (ref 10.3–14.5)
SODIUM SERPL-SCNC: 140 MMOL/L — SIGNIFICANT CHANGE UP (ref 135–145)
SP GR SPEC: 1.01 — SIGNIFICANT CHANGE UP (ref 1.01–1.02)
UROBILINOGEN FLD QL: NEGATIVE — SIGNIFICANT CHANGE UP
WBC # BLD: 10.84 K/UL — HIGH (ref 3.8–10.5)
WBC # FLD AUTO: 10.84 K/UL — HIGH (ref 3.8–10.5)

## 2022-08-10 LAB — SARS-COV-2 N GENE NPH QL NAA+PROBE: NOT DETECTED

## 2022-08-11 RX ORDER — OXYCODONE HYDROCHLORIDE 5 MG/1
5 TABLET ORAL ONCE
Refills: 0 | Status: DISCONTINUED | OUTPATIENT
Start: 2022-08-12 | End: 2022-08-12

## 2022-08-11 RX ORDER — FENTANYL CITRATE 50 UG/ML
50 INJECTION INTRAVENOUS
Refills: 0 | Status: DISCONTINUED | OUTPATIENT
Start: 2022-08-12 | End: 2022-08-12

## 2022-08-11 RX ORDER — SODIUM CHLORIDE 9 MG/ML
1000 INJECTION, SOLUTION INTRAVENOUS
Refills: 0 | Status: DISCONTINUED | OUTPATIENT
Start: 2022-08-12 | End: 2022-08-12

## 2022-08-11 RX ORDER — ONDANSETRON 8 MG/1
4 TABLET, FILM COATED ORAL ONCE
Refills: 0 | Status: DISCONTINUED | OUTPATIENT
Start: 2022-08-12 | End: 2022-08-12

## 2022-08-12 ENCOUNTER — OUTPATIENT (OUTPATIENT)
Dept: INPATIENT UNIT | Facility: HOSPITAL | Age: 32
LOS: 1 days | Discharge: HOME | End: 2022-08-12
Payer: MEDICAID

## 2022-08-12 ENCOUNTER — TRANSCRIPTION ENCOUNTER (OUTPATIENT)
Age: 32
End: 2022-08-12

## 2022-08-12 ENCOUNTER — APPOINTMENT (OUTPATIENT)
Dept: UROLOGY | Facility: HOSPITAL | Age: 32
End: 2022-08-12

## 2022-08-12 VITALS
SYSTOLIC BLOOD PRESSURE: 103 MMHG | RESPIRATION RATE: 16 BRPM | DIASTOLIC BLOOD PRESSURE: 64 MMHG | TEMPERATURE: 97 F | OXYGEN SATURATION: 100 % | HEART RATE: 50 BPM

## 2022-08-12 VITALS
TEMPERATURE: 98 F | RESPIRATION RATE: 16 BRPM | HEART RATE: 72 BPM | HEIGHT: 63 IN | OXYGEN SATURATION: 98 % | DIASTOLIC BLOOD PRESSURE: 62 MMHG | SYSTOLIC BLOOD PRESSURE: 107 MMHG | WEIGHT: 158.73 LBS

## 2022-08-12 DIAGNOSIS — N20.1 CALCULUS OF URETER: ICD-10-CM

## 2022-08-12 DIAGNOSIS — Z98.891 HISTORY OF UTERINE SCAR FROM PREVIOUS SURGERY: Chronic | ICD-10-CM

## 2022-08-12 DIAGNOSIS — Z98.890 OTHER SPECIFIED POSTPROCEDURAL STATES: Chronic | ICD-10-CM

## 2022-08-12 DIAGNOSIS — S02.609A FRACTURE OF MANDIBLE, UNSPECIFIED, INITIAL ENCOUNTER FOR CLOSED FRACTURE: Chronic | ICD-10-CM

## 2022-08-12 LAB — HCG UR QL: NEGATIVE — SIGNIFICANT CHANGE UP

## 2022-08-12 PROCEDURE — 81025 URINE PREGNANCY TEST: CPT

## 2022-08-12 PROCEDURE — 52351 CYSTOURETERO & OR PYELOSCOPE: CPT | Mod: LT

## 2022-08-12 PROCEDURE — C1889: CPT

## 2022-08-12 PROCEDURE — 76000 FLUOROSCOPY <1 HR PHYS/QHP: CPT

## 2022-08-12 PROCEDURE — C1769: CPT

## 2022-08-12 RX ORDER — CIPROFLOXACIN LACTATE 400MG/40ML
1 VIAL (ML) INTRAVENOUS
Qty: 6 | Refills: 0
Start: 2022-08-12 | End: 2022-08-14

## 2022-08-12 RX ORDER — OXYBUTYNIN CHLORIDE 5 MG
5 TABLET ORAL ONCE
Refills: 0 | Status: COMPLETED | OUTPATIENT
Start: 2022-08-12 | End: 2022-08-12

## 2022-08-12 RX ORDER — ALBUTEROL 90 UG/1
2 AEROSOL, METERED ORAL
Qty: 0 | Refills: 0 | DISCHARGE

## 2022-08-12 RX ORDER — PHENAZOPYRIDINE HCL 100 MG
1 TABLET ORAL
Qty: 9 | Refills: 0
Start: 2022-08-12 | End: 2022-08-14

## 2022-08-12 RX ORDER — OXYBUTYNIN CHLORIDE 5 MG
1 TABLET ORAL
Qty: 9 | Refills: 0
Start: 2022-08-12 | End: 2022-08-14

## 2022-08-12 RX ORDER — PHENAZOPYRIDINE HCL 100 MG
200 TABLET ORAL ONCE
Refills: 0 | Status: COMPLETED | OUTPATIENT
Start: 2022-08-12 | End: 2022-08-12

## 2022-08-12 RX ADMIN — Medication 200 MILLIGRAM(S): at 14:26

## 2022-08-12 RX ADMIN — Medication 5 MILLIGRAM(S): at 14:26

## 2022-08-12 NOTE — ASU DISCHARGE PLAN (ADULT/PEDIATRIC) - NURSING INSTRUCTIONS
Begin with liquids and light food ( tea, toast, Jello, soups). Advance to what you normally eat. Liquids should taken in adequate amounts today.Refer to multi color post op discharge instruction sheet     CALL the DOCTOR:    -Fever greater than  101F  - Signs  of infection such as : increase pain,swelling,redness,or a bad  smell coming from the wound.  -Excessive amount of bleeding.  - Any pain that appears to be getting worse.  - Vomiting  -  If you have  not urinated 8 hours after surgery or have any difficulty urinating.     A responsible adult should be with you for the rest of the day and night for your safety and to help you if you needed.    Review attached FACT SHEET if applicable. Review home care instructions For any problems or concerns,contact your doctor. [facility] patients should call the [facility]. If you cannot reach the doctor or clinic, call [facility] Department at [phone] or go to your local Emergency Department.  A responsible adult should be with you for the rest of the day and night for your safety and to help you if you needed. Resume your medications as listed on the attached Medication Record.

## 2022-08-12 NOTE — ASU PATIENT PROFILE, ADULT - NSCAFFEINETYPE_GEN_ALL_CORE_SD
Prescriptions Prior to Admission   Medication Sig Dispense Refill Last Dose    atorvastatin (LIPITOR) 40 MG tablet    Taking    losartan-hydrochlorothiazide 100-25 mg (HYZAAR) 100-25 mg per tablet    Taking    potassium chloride (K-TAB) 20 mEq    Taking       Review of patient's allergies indicates:  No Known Allergies    Past Medical History:   Diagnosis Date    Hyperlipidemia     Hypertension     Stroke 09/2017    residual deficits are ambulates on her own with a limp, but has limited use of her right arm; emotional lability    Vascular dementia     since 9/2017 stroke     History reviewed. No pertinent surgical history.  Family History     None        Social History Main Topics    Smoking status: Former Smoker    Smokeless tobacco: Not on file    Alcohol use 8.4 oz/week     14 Shots of liquor per week    Drug use: No    Sexual activity: Not on file     Review of Systems   Unable to perform ROS: Dementia     Objective:     Weight: 40 kg (88 lb 2.9 oz)  Body mass index is 15.14 kg/m².  Vital Signs (Most Recent):  Temp: 98.8 °F (37.1 °C) (02/25/18 1128)  Pulse: 89 (02/25/18 1128)  Resp: 16 (02/25/18 1128)  BP: (!) 117/57 (02/25/18 1128)  SpO2: 100 % (02/25/18 1128) Vital Signs (24h Range):  Temp:  [98.1 °F (36.7 °C)-98.8 °F (37.1 °C)] 98.8 °F (37.1 °C)  Pulse:  [89-94] 89  Resp:  [16] 16  SpO2:  [95 %-100 %] 100 %  BP: (117-156)/(57-72) 117/57     Neurosurgery Physical Exam    Awake, alert, oriented to person only.  PERRL, EOMI, FS.  Not following commands. TREVINO spontaneously and symm.   No respiratory distress.    Significant Labs:    Recent Labs  Lab 02/25/18  0830   *   *   K 3.4*   *   CO2 23   BUN 62*   CREATININE 1.4   CALCIUM 9.4   MG 2.1       Recent Labs  Lab 02/25/18  0830   WBC 13.35*   HGB 10.8*   HCT 33.6*          Recent Labs  Lab 02/25/18  1004   INR 1.1     Microbiology Results (last 7 days)     Procedure Component Value Units Date/Time    Blood culture  [[id number]] Collected:  02/25/18 1004    Order Status:  Sent Specimen:  Blood Updated:  02/25/18 1035    Blood Culture #2 **CANNOT BE ORDERED STAT** [[id number]] Collected:  02/25/18 1005    Order Status:  Sent Specimen:  Blood Updated:  02/25/18 1035        All pertinent labs from the last 24 hours have been reviewed.    Significant Diagnostics:  I have reviewed all pertinent imaging results/findings within the past 24 hours.   coffee

## 2022-08-12 NOTE — ASU PATIENT PROFILE, ADULT - NSICDXPASTSURGICALHX_GEN_ALL_CORE_FT
PAST SURGICAL HISTORY:  Fracture, mandible 2019    H/O  section 2016    H/O cystoscopy Left Ureteral Stent placed--2022

## 2022-08-12 NOTE — ASU DISCHARGE PLAN (ADULT/PEDIATRIC) - CARE PROVIDER_API CALL
Tristian Rubio)  Urology  [address], [address] [place] [address]  Phone: [phone]  Fax: [phone]  Follow Up Time: 1 week

## 2022-08-12 NOTE — ASU PATIENT PROFILE, ADULT - FALL HARM RISK - UNIVERSAL INTERVENTIONS
Bed in lowest position, wheels locked, appropriate side rails in place/Call bell, personal items and telephone in reach/Instruct patient to call for assistance before getting out of bed or chair/Non-slip footwear when patient is out of bed/[place] to call system/Physically safe environment - no spills, clutter or unnecessary equipment/Purposeful Proactive Rounding/Room/bathroom lighting operational, light cord in reach

## 2022-08-12 NOTE — BRIEF OPERATIVE NOTE - OPERATION/FINDINGS
no stone seen in LEFT ureter. Tiny calcification emerging from randalls plaque in lower pole seen. attempted to basket but was too small.

## 2022-08-12 NOTE — ASU DISCHARGE PLAN (ADULT/PEDIATRIC) - NS MD DC FALL RISK RISK
For information on Fall & Injury Prevention, visit: [email or web address] OR  [email or web address] OR  https://www.cdc.gov/steadi/patient.html

## 2022-08-17 DIAGNOSIS — N20.1 CALCULUS OF URETER: ICD-10-CM

## 2022-08-17 DIAGNOSIS — Z87.442 PERSONAL HISTORY OF URINARY CALCULI: ICD-10-CM

## 2022-08-17 DIAGNOSIS — R10.9 UNSPECIFIED ABDOMINAL PAIN: ICD-10-CM

## 2022-08-17 DIAGNOSIS — Z46.6 ENCOUNTER FOR FITTING AND ADJUSTMENT OF URINARY DEVICE: ICD-10-CM

## 2022-08-17 DIAGNOSIS — J45.909 UNSPECIFIED ASTHMA, UNCOMPLICATED: ICD-10-CM

## 2022-08-17 DIAGNOSIS — F41.9 ANXIETY DISORDER, UNSPECIFIED: ICD-10-CM

## 2022-08-19 ENCOUNTER — APPOINTMENT (OUTPATIENT)
Dept: UROLOGY | Facility: CLINIC | Age: 32
End: 2022-08-19

## 2022-08-25 ENCOUNTER — APPOINTMENT (OUTPATIENT)
Dept: UROLOGY | Facility: CLINIC | Age: 32
End: 2022-08-25

## 2022-08-25 VITALS
SYSTOLIC BLOOD PRESSURE: 104 MMHG | BODY MASS INDEX: 28.35 KG/M2 | HEART RATE: 68 BPM | WEIGHT: 160 LBS | DIASTOLIC BLOOD PRESSURE: 62 MMHG | OXYGEN SATURATION: 100 % | HEIGHT: 63 IN

## 2022-08-25 DIAGNOSIS — N20.1 CALCULUS OF URETER: ICD-10-CM

## 2022-08-25 PROBLEM — N20.0 CALCULUS OF KIDNEY: Chronic | Status: ACTIVE | Noted: 2022-08-03

## 2022-08-25 PROBLEM — J45.909 UNSPECIFIED ASTHMA, UNCOMPLICATED: Chronic | Status: ACTIVE | Noted: 2022-08-03

## 2022-08-25 PROBLEM — F41.9 ANXIETY DISORDER, UNSPECIFIED: Chronic | Status: ACTIVE | Noted: 2022-08-03

## 2022-08-25 PROCEDURE — 99213 OFFICE O/P EST LOW 20 MIN: CPT

## 2022-08-25 NOTE — ASSESSMENT
[FreeTextEntry1] : 30 yo female with 4 mm left ureteral stone and UTI s/p left ureteral stent placement and subsequent negative ureteroscopy. Patient is doing well post operatively, no complications. We discussed obtaining obtaining renal sonogram in 6 weeks to evaluate for residual stone burden or persistent hydronephrosis which could indicate distal stones or possible ureteral stricture. We discussed KUB xray to rule out stones in the ureters. Will also send for blood work for serum electrolytes, uric acid, PTH, TSH and Cr for metabolic work up. Patient was instructed to perform 24 hour urine collection to assay urine electrolytes. This would allow us to discuss lifestyle, dietary, or pharmacologic interventions to reduce stone recurrence risk. For now, patient was instructed to increase fluid intake to a urine output of 2L daily, to add lemon juice to the water as a source of citrate, and to reduce sodium intake. All of the patient's questions and concerns addressed. \par - RBUS, KUB in 6 weeks\par - 24 hour urine collection and blood work for metabolic work up\par - RTC 6 weeks \par \par

## 2022-08-25 NOTE — HISTORY OF PRESENT ILLNESS
[FreeTextEntry1] : 31 year old woman seen 07/07/2022 with left ureteral stone. Pt was seen at  for intractable left flank pain found to have 4 mm left distal ureteral stone s/p cystoscopy left ureteral stent placement. noted with a UTI UCx >100K E coli, treated with antibiotics. Presents to discuss stone/stent management. Patient is doing well with the stent and denies any symptoms. \par It is mild to moderate in severity. Nothing makes the symptoms better, nothing makes sx worse. \par It is associated with nothing.\par No hematuria, no dysuria, no frequency, no urgency, no hesitancy, no straining. No incontinence. \par No fevers, no chills, no nausea, no vomiting, no flank pain. \par \par No family history contributory to kidney stones.\par \par 08/25/2022: Patient presents for follow up. She underwent URS 8/12/2022, which showed multiple Robert's plaques throughout kidney, but no discrete stones. She reports some flank discomfort, but overall is doing well.

## 2022-09-19 RX ORDER — ALBUTEROL 90 UG/1
2 AEROSOL, METERED ORAL
Qty: 0 | Refills: 0 | DISCHARGE

## 2022-09-28 ENCOUNTER — APPOINTMENT (OUTPATIENT)
Dept: ULTRASOUND IMAGING | Facility: CLINIC | Age: 32
End: 2022-09-28

## 2022-09-28 ENCOUNTER — OUTPATIENT (OUTPATIENT)
Dept: OUTPATIENT SERVICES | Facility: HOSPITAL | Age: 32
LOS: 1 days | End: 2022-09-28
Payer: MEDICAID

## 2022-09-28 ENCOUNTER — APPOINTMENT (OUTPATIENT)
Dept: RADIOLOGY | Facility: CLINIC | Age: 32
End: 2022-09-28

## 2022-09-28 DIAGNOSIS — S02.609A FRACTURE OF MANDIBLE, UNSPECIFIED, INITIAL ENCOUNTER FOR CLOSED FRACTURE: Chronic | ICD-10-CM

## 2022-09-28 DIAGNOSIS — Z98.891 HISTORY OF UTERINE SCAR FROM PREVIOUS SURGERY: Chronic | ICD-10-CM

## 2022-09-28 DIAGNOSIS — N20.1 CALCULUS OF URETER: ICD-10-CM

## 2022-09-28 DIAGNOSIS — Z98.890 OTHER SPECIFIED POSTPROCEDURAL STATES: Chronic | ICD-10-CM

## 2022-09-28 PROBLEM — Z78.9 OTHER SPECIFIED HEALTH STATUS: Chronic | Status: ACTIVE | Noted: 2022-06-28

## 2022-09-28 PROCEDURE — 74018 RADEX ABDOMEN 1 VIEW: CPT | Mod: 26

## 2022-09-28 PROCEDURE — 76775 US EXAM ABDO BACK WALL LIM: CPT | Mod: 26

## 2022-09-28 PROCEDURE — 74018 RADEX ABDOMEN 1 VIEW: CPT

## 2022-09-28 PROCEDURE — 76775 US EXAM ABDO BACK WALL LIM: CPT

## 2022-10-06 ENCOUNTER — APPOINTMENT (OUTPATIENT)
Dept: UROLOGY | Facility: CLINIC | Age: 32
End: 2022-10-06

## 2023-07-06 NOTE — BRIEF OPERATIVE NOTE - NSICDXBRIEFPOSTOP_GEN_ALL_CORE_FT
[FreeTextEntry2] : 07/06/2023 :JANELLE BUCK , a 62 year old female, presents today for bilateral hand pain numbness tingling, went to urgent care was given medrol dose pack \par  POST-OP DIAGNOSIS:  Left ureteral stone 28-Jun-2022 20:59:04  Hilario Roblero

## 2024-09-25 NOTE — H&P PST ADULT - MEDICATION ADMINISTRATION INFO, PROFILE
46 year old F with PMHx of PE previously on eliquis, self weaned, neurofibromatosis, and migraines who presented to the ED on 9/23; she is complaining of cough, clear frothy sputum, and low grade temp x1d. She denies any other complaints except today felt a little short of breath.      IMPRESSION  Pulmonary  Embolus  -submassive   No Right heart strain   H/O PE . Off a/c since 2019  Hemodynamically Stable   Started on Hep Drip. Pulm recs appreciated. Switched to therapeutic lovenox at 18:00 on 9/24/24. Heparin d/c'd around 14:00 9/24/24.  Autoimmune workup requested by pulm --- Specimens received by performing department --- F/U final results.  LE duplex ordered as per pulm recs --- not yet performed  Hypercoagulable workup as outpt  Complex areas of bronchiectasis and cavitary lesions with superimposed consolidations within the left upper and lower lung fields. F/u ID. ID note appreciated. patient off of airborne isolation per their permission on 9/24/24.  Quantiferon negative in 2018  Follow-up AFB sputum Cx collected on 9/23/24 but not on 9/24/24.  Collected on 9/25. Ordered again for 9/26 (which will be day 3 of 3)  Repeat AFB sputum here x 2  -would need 2 positive Cx to establish diagnosis   continue zosyn 3.375 mg q 8 hours for now   F/u blood culture and sputum culture. Negative to date.  Follow Urine strep , legionella -- specimens received by performing department  MRSA pcr negative  Procal 0.08  Remains medicably active for now    Dispo: 3C  Pending: Duplex LE, 3rd acid fast sputum tomorrow AM (ensure that collected!!!), final blood and sputum cultures, urine study results, autoimmune workup results no concerns

## 2025-03-18 ENCOUNTER — NON-APPOINTMENT (OUTPATIENT)
Age: 35
End: 2025-03-18

## 2025-03-18 ENCOUNTER — APPOINTMENT (OUTPATIENT)
Dept: GASTROENTEROLOGY | Facility: CLINIC | Age: 35
End: 2025-03-18
Payer: MEDICAID

## 2025-03-18 VITALS
RESPIRATION RATE: 16 BRPM | HEART RATE: 70 BPM | BODY MASS INDEX: 27.46 KG/M2 | HEIGHT: 63 IN | DIASTOLIC BLOOD PRESSURE: 70 MMHG | OXYGEN SATURATION: 98 % | SYSTOLIC BLOOD PRESSURE: 115 MMHG | WEIGHT: 155 LBS

## 2025-03-18 DIAGNOSIS — Z84.1 FAMILY HISTORY OF DISORDERS OF KIDNEY AND URETER: ICD-10-CM

## 2025-03-18 DIAGNOSIS — K64.4 RESIDUAL HEMORRHOIDAL SKIN TAGS: ICD-10-CM

## 2025-03-18 PROCEDURE — 99203 OFFICE O/P NEW LOW 30 MIN: CPT | Mod: 25

## 2025-03-18 PROCEDURE — 82270 OCCULT BLOOD FECES: CPT

## 2025-03-18 RX ORDER — HYDROCORTISONE 25 MG/G
2.5 CREAM TOPICAL
Qty: 1 | Refills: 5 | Status: ACTIVE | COMMUNITY
Start: 2025-03-18 | End: 1900-01-01

## 2025-04-10 ENCOUNTER — NON-APPOINTMENT (OUTPATIENT)
Age: 35
End: 2025-04-10

## 2025-04-10 ENCOUNTER — APPOINTMENT (OUTPATIENT)
Dept: COLORECTAL SURGERY | Facility: CLINIC | Age: 35
End: 2025-04-10
Payer: MEDICAID

## 2025-04-10 VITALS
TEMPERATURE: 98.4 F | DIASTOLIC BLOOD PRESSURE: 65 MMHG | OXYGEN SATURATION: 97 % | RESPIRATION RATE: 14 BRPM | BODY MASS INDEX: 27.11 KG/M2 | HEIGHT: 63 IN | SYSTOLIC BLOOD PRESSURE: 101 MMHG | HEART RATE: 78 BPM | WEIGHT: 153 LBS

## 2025-04-10 DIAGNOSIS — K64.4 RESIDUAL HEMORRHOIDAL SKIN TAGS: ICD-10-CM

## 2025-04-10 PROCEDURE — 99204 OFFICE O/P NEW MOD 45 MIN: CPT | Mod: 25

## 2025-04-10 PROCEDURE — 46600 DIAGNOSTIC ANOSCOPY SPX: CPT

## 2025-04-11 ENCOUNTER — NON-APPOINTMENT (OUTPATIENT)
Age: 35
End: 2025-04-11